# Patient Record
Sex: MALE | Race: ASIAN | Employment: FULL TIME | URBAN - METROPOLITAN AREA
[De-identification: names, ages, dates, MRNs, and addresses within clinical notes are randomized per-mention and may not be internally consistent; named-entity substitution may affect disease eponyms.]

---

## 2017-01-09 ENCOUNTER — TELEPHONE (OUTPATIENT)
Dept: FAMILY MEDICINE CLINIC | Facility: CLINIC | Age: 47
End: 2017-01-09

## 2017-01-09 DIAGNOSIS — K64.9 HEMORRHOIDS, UNSPECIFIED HEMORRHOID TYPE: Primary | ICD-10-CM

## 2017-01-09 NOTE — TELEPHONE ENCOUNTER
Not much I can do for hemorrhoids. I recommend he see a surgeon who can correct this issue.   Consult General Surgery, Dr. Pan Beavers   Tel: [phone]  Dxj [phone]

## 2017-01-09 NOTE — TELEPHONE ENCOUNTER
Problem with a hemorrhoid, squirts blood everytime he sits on the toilet. Right now pain is gone, purchased some medicated wipes, seem to help. Also increased fiber  In his diet. Had epiusodes of dizziness, he attributes to blood loss.  He increased fluid i

## 2017-01-09 NOTE — TELEPHONE ENCOUNTER
Dr. Clifton Carter no longer with THE [facility]. He needs referral for IHP, B/C B/S MIRYAMOShemar?

## 2017-01-09 NOTE — TELEPHONE ENCOUNTER
He will need to see Dr. Lianne Haas, but Dr. Lianne Haas is out of office currently. He could see Dr. Kecia Guzman, but that would be in [place].

## 2017-01-16 NOTE — TELEPHONE ENCOUNTER
Referral needs to be placed. Dr. Aaron Olivia will return to office as of tomorrow. Please advise. Referral pending. Thanks.

## 2017-02-01 ENCOUNTER — HOSPITAL ENCOUNTER (OUTPATIENT)
Dept: GENERAL RADIOLOGY | Age: 47
Discharge: HOME | End: 2017-02-01
Attending: FAMILY MEDICINE
Payer: COMMERCIAL

## 2017-02-01 ENCOUNTER — OFFICE VISIT (OUTPATIENT)
Dept: FAMILY MEDICINE CLINIC | Facility: CLINIC | Age: 47
End: 2017-02-01

## 2017-02-01 VITALS
SYSTOLIC BLOOD PRESSURE: 120 MMHG | WEIGHT: 226 LBS | RESPIRATION RATE: 16 BRPM | BODY MASS INDEX: 32 KG/M2 | TEMPERATURE: 98 F | HEART RATE: 84 BPM | DIASTOLIC BLOOD PRESSURE: 88 MMHG

## 2017-02-01 DIAGNOSIS — R07.89 CHEST HEAVINESS: ICD-10-CM

## 2017-02-01 DIAGNOSIS — R42 DIZZINESS: ICD-10-CM

## 2017-02-01 DIAGNOSIS — D64.9 ANEMIA, UNSPECIFIED TYPE: ICD-10-CM

## 2017-02-01 DIAGNOSIS — K64.8 OTHER HEMORRHOIDS: Primary | ICD-10-CM

## 2017-02-01 LAB
ALBUMIN SERPL-MCNC: 4.2 G/DL (ref 3.5–4.8)
ALP LIVER SERPL-CCNC: 71 U/L (ref 45–117)
ALT SERPL-CCNC: 35 U/L (ref 17–63)
AST SERPL-CCNC: 19 U/L (ref 15–41)
BILIRUB SERPL-MCNC: 0.4 MG/DL (ref 0.1–2)
BUN BLD-MCNC: 16 MG/DL (ref 8–20)
CALCIUM BLD-MCNC: 9.5 MG/DL (ref 8.3–10.3)
CHLORIDE: 106 MMOL/L (ref 101–111)
CO2: 26 MMOL/L (ref 22–32)
CREAT BLD-MCNC: 1.15 MG/DL (ref 0.7–1.3)
ERYTHROCYTE [DISTWIDTH] IN BLOOD BY AUTOMATED COUNT: 13.2 % (ref 11.5–16)
GLUCOSE BLD-MCNC: 77 MG/DL (ref 70–99)
HCT VFR BLD AUTO: 30.4 % (ref 37–53)
HGB BLD-MCNC: 9.9 G/DL (ref 13–17)
M PROTEIN MFR SERPL ELPH: 8.1 G/DL (ref 6.1–8.3)
MCH RBC QN AUTO: 28.9 PG (ref 27–33.2)
MCHC RBC AUTO-ENTMCNC: 32.6 G/DL (ref 31–37)
MCV RBC AUTO: 88.9 FL (ref 80–99)
PLATELET # BLD AUTO: 364 10(3)UL (ref 150–450)
POTASSIUM SERPL-SCNC: 4 MMOL/L (ref 3.6–5.1)
RBC # BLD AUTO: 3.42 X10(6)UL (ref 4.3–5.7)
RED CELL DISTRIBUTION WIDTH-SD: 42.9 FL (ref 35.1–46.3)
SODIUM SERPL-SCNC: 139 MMOL/L (ref 136–144)
TSI SER-ACNC: 1.24 MIU/ML (ref 0.35–5.5)
WBC # BLD AUTO: 8.3 X10(3) UL (ref 4–13)

## 2017-02-01 PROCEDURE — 71020 XR CHEST PA + LAT CHEST (CPT=71020): CPT

## 2017-02-01 PROCEDURE — 93000 ELECTROCARDIOGRAM COMPLETE: CPT | Performed by: FAMILY MEDICINE

## 2017-02-01 PROCEDURE — 85027 COMPLETE CBC AUTOMATED: CPT | Performed by: FAMILY MEDICINE

## 2017-02-01 PROCEDURE — 82607 VITAMIN B-12: CPT | Performed by: FAMILY MEDICINE

## 2017-02-01 PROCEDURE — 99214 OFFICE O/P EST MOD 30 MIN: CPT | Performed by: FAMILY MEDICINE

## 2017-02-01 PROCEDURE — 80053 COMPREHEN METABOLIC PANEL: CPT | Performed by: FAMILY MEDICINE

## 2017-02-01 PROCEDURE — 85652 RBC SED RATE AUTOMATED: CPT | Performed by: FAMILY MEDICINE

## 2017-02-01 PROCEDURE — 84443 ASSAY THYROID STIM HORMONE: CPT | Performed by: FAMILY MEDICINE

## 2017-02-01 PROCEDURE — 82728 ASSAY OF FERRITIN: CPT | Performed by: FAMILY MEDICINE

## 2017-02-01 NOTE — PROGRESS NOTES
Fátima Honeycutt is a 55year old male. CC:  Patient presents with:  Dizziness      HPI:  Feeling dizzy for 2-3 weeks. It occurs after going from being bent over to standing and can last 5-10 seconds.  He also notes intermittent chest heaviness that Yesy Mock M.D.   Supine:  /88, P 84  Sitting /85, P 84  Standing /70 P 84    Physical Exam:  GEN: well developed, well nourished, in no apparent distress  EYE: B conjunctiva and lids normal  HENT: normocephalic; normal nose, pharynx and TM'

## 2017-02-02 ENCOUNTER — TELEPHONE (OUTPATIENT)
Dept: FAMILY MEDICINE CLINIC | Facility: CLINIC | Age: 47
End: 2017-02-02

## 2017-02-02 DIAGNOSIS — D50.8 OTHER IRON DEFICIENCY ANEMIA: Primary | ICD-10-CM

## 2017-02-02 DIAGNOSIS — D64.9 ANEMIA, UNSPECIFIED TYPE: Primary | ICD-10-CM

## 2017-02-02 LAB
DEPRECATED HBV CORE AB SER IA-ACNC: 5.1 NG/ML (ref 22–322)
HAV AB SERPL IA-ACNC: 548 PG/ML (ref 193–986)
SED RATE-ML: 28 MM/HR (ref 0–12)

## 2017-02-02 NOTE — TELEPHONE ENCOUNTER
Left message on patients voice mail with the information per Dr Artemio Sauer left for [facility]

## 2017-02-02 NOTE — TELEPHONE ENCOUNTER
Please let patient know that the sugar, electrolyte, liver, kidney, thyroid, and leukemia tests were fine. He is anemic and this is likely the reason he has his current set of symptoms.  I'm not convinced that he is anemic because of his recent hemorrhoid i

## 2017-02-02 NOTE — TELEPHONE ENCOUNTER
Patient was advised that he is anemic and Dr. Leonard Tejada wants to make sure there is nothing going on with his GI tract

## 2017-03-13 ENCOUNTER — TELEPHONE (OUTPATIENT)
Dept: FAMILY MEDICINE CLINIC | Facility: CLINIC | Age: 47
End: 2017-03-13

## 2017-03-13 DIAGNOSIS — K64.8 OTHER HEMORRHOIDS: Primary | ICD-10-CM

## 2017-04-10 ENCOUNTER — TELEPHONE (OUTPATIENT)
Dept: FAMILY MEDICINE CLINIC | Facility: CLINIC | Age: 47
End: 2017-04-10

## 2017-05-19 ENCOUNTER — TELEPHONE (OUTPATIENT)
Dept: FAMILY MEDICINE CLINIC | Facility: CLINIC | Age: 47
End: 2017-05-19

## 2017-08-02 ENCOUNTER — OFFICE VISIT (OUTPATIENT)
Dept: FAMILY MEDICINE CLINIC | Facility: CLINIC | Age: 47
End: 2017-08-02

## 2017-08-02 VITALS
DIASTOLIC BLOOD PRESSURE: 80 MMHG | HEART RATE: 72 BPM | WEIGHT: 203 LBS | TEMPERATURE: 98 F | RESPIRATION RATE: 16 BRPM | HEIGHT: 68 IN | BODY MASS INDEX: 30.77 KG/M2 | SYSTOLIC BLOOD PRESSURE: 118 MMHG

## 2017-08-02 DIAGNOSIS — Z00.00 WELL ADULT EXAM: Primary | ICD-10-CM

## 2017-08-02 DIAGNOSIS — D64.9 ANEMIA, UNSPECIFIED TYPE: ICD-10-CM

## 2017-08-02 DIAGNOSIS — L91.8 CUTANEOUS SKIN TAGS: ICD-10-CM

## 2017-08-02 PROCEDURE — 99396 PREV VISIT EST AGE 40-64: CPT | Performed by: FAMILY MEDICINE

## 2017-08-02 NOTE — PROGRESS NOTES
Mayra Home is a 52year old male. CC:  Patient presents with:   Well Adult: per pt      HPI:  Yearly PX  Last lipid: 6/16    Immunization History  Administered            Date(s) Administered    Influenza Vaccine, No Preserv, 3YR + melena, he had hemorrhoidectomy earlier this year    (male): Denies nocturia, dysuria, hematuria   Heme: he is overdue to recheck labs for anemia found earlier this year, he took iron supplements for 2 months    Vitals: /80   Pulse 72   Temp 97.6 °

## 2017-08-05 ENCOUNTER — NURSE ONLY (OUTPATIENT)
Dept: FAMILY MEDICINE CLINIC | Facility: CLINIC | Age: 47
End: 2017-08-05

## 2017-08-05 ENCOUNTER — TELEPHONE (OUTPATIENT)
Dept: FAMILY MEDICINE CLINIC | Facility: CLINIC | Age: 47
End: 2017-08-05

## 2017-08-05 DIAGNOSIS — Z00.00 WELL ADULT EXAM: ICD-10-CM

## 2017-08-05 DIAGNOSIS — D50.0 IRON DEFICIENCY ANEMIA DUE TO CHRONIC BLOOD LOSS: ICD-10-CM

## 2017-08-05 LAB
BASOPHILS # BLD AUTO: 0.04 X10(3) UL (ref 0–0.1)
BASOPHILS NFR BLD AUTO: 0.7 %
CHOLEST SMN-MCNC: 222 MG/DL (ref ?–200)
EOSINOPHIL # BLD AUTO: 0.1 X10(3) UL (ref 0–0.3)
EOSINOPHIL NFR BLD AUTO: 1.9 %
ERYTHROCYTE [DISTWIDTH] IN BLOOD BY AUTOMATED COUNT: 13.8 % (ref 11.5–16)
HCT VFR BLD AUTO: 46.9 % (ref 37–53)
HDLC SERPL-MCNC: 62 MG/DL (ref 45–?)
HDLC SERPL: 3.58 {RATIO} (ref ?–4.97)
HGB BLD-MCNC: 15.3 G/DL (ref 13–17)
IMMATURE GRANULOCYTE COUNT: 0.01 X10(3) UL (ref 0–1)
IMMATURE GRANULOCYTE RATIO %: 0.2 %
IRON SATURATION: 14 % (ref 13–45)
IRON: 81 UG/DL (ref 45–182)
LDLC SERPL CALC-MCNC: 141 MG/DL (ref ?–130)
LDLC SERPL-MCNC: 19 MG/DL (ref 5–40)
LYMPHOCYTES # BLD AUTO: 2.27 X10(3) UL (ref 0.9–4)
LYMPHOCYTES NFR BLD AUTO: 42.1 %
MCH RBC QN AUTO: 29.7 PG (ref 27–33.2)
MCHC RBC AUTO-ENTMCNC: 32.6 G/DL (ref 31–37)
MCV RBC AUTO: 91.1 FL (ref 80–99)
MONOCYTES # BLD AUTO: 0.33 X10(3) UL (ref 0.1–0.6)
MONOCYTES NFR BLD AUTO: 6.1 %
NEUTROPHIL ABS PRELIM: 2.64 X10 (3) UL (ref 1.3–6.7)
NEUTROPHILS # BLD AUTO: 2.64 X10(3) UL (ref 1.3–6.7)
NEUTROPHILS NFR BLD AUTO: 49 %
NONHDLC SERPL-MCNC: 160 MG/DL (ref ?–130)
PLATELET # BLD AUTO: 255 10(3)UL (ref 150–450)
RBC # BLD AUTO: 5.15 X10(6)UL (ref 4.3–5.7)
RED CELL DISTRIBUTION WIDTH-SD: 46 FL (ref 35.1–46.3)
TOTAL IRON BINDING CAPACITY: 580 UG/DL (ref 298–536)
TRANSFERRIN: 389 MG/DL (ref 200–360)
TRIGLYCERIDES: 94 MG/DL (ref ?–150)
WBC # BLD AUTO: 5.4 X10(3) UL (ref 4–13)

## 2017-08-05 PROCEDURE — 80061 LIPID PANEL: CPT | Performed by: INTERNAL MEDICINE

## 2017-08-05 PROCEDURE — 83550 IRON BINDING TEST: CPT | Performed by: INTERNAL MEDICINE

## 2017-08-05 PROCEDURE — 83540 ASSAY OF IRON: CPT | Performed by: INTERNAL MEDICINE

## 2017-08-05 PROCEDURE — 85025 COMPLETE CBC W/AUTO DIFF WBC: CPT | Performed by: INTERNAL MEDICINE

## 2017-08-05 NOTE — TELEPHONE ENCOUNTER
Patient to clinic for lab draw. Lipid, TIBC and CBC. Patient asking if CMP will be drawn. Advised patient CMP wnl in February. Todays labs were to f/u on anemia check cholesterol. Patient wanted to make sure CMP not needed.   Specimen available if you

## 2017-08-08 ENCOUNTER — TELEPHONE (OUTPATIENT)
Dept: FAMILY MEDICINE CLINIC | Facility: CLINIC | Age: 47
End: 2017-08-08

## 2017-08-08 NOTE — TELEPHONE ENCOUNTER
Patient was advised of the blood work results and information regarding the CT calcium heart score. Patient is asking if he is still anemic?

## 2017-08-08 NOTE — TELEPHONE ENCOUNTER
----- Message from Nadine Chavez MD sent at 8/8/2017 12:36 PM CDT -----  Please let the patient know that the Total cholesterol was elevated, but that is compensated for by an HDL > 60.  I would like to have the patient get a CT calcium heart score to asse

## 2017-08-15 NOTE — PROGRESS NOTES
8/15/2017  Carmella Ramirez  [address] [facility] [address]    Dear Kt Martinez,       Here are your results from your recent visit with Gastroenterology. Normal CBC. Can stop iron treatment.             If you need any further assistance, ple

## 2017-08-19 ENCOUNTER — OFFICE VISIT (OUTPATIENT)
Dept: FAMILY MEDICINE CLINIC | Facility: CLINIC | Age: 47
End: 2017-08-19

## 2017-08-19 VITALS
DIASTOLIC BLOOD PRESSURE: 80 MMHG | SYSTOLIC BLOOD PRESSURE: 114 MMHG | RESPIRATION RATE: 16 BRPM | BODY MASS INDEX: 31 KG/M2 | WEIGHT: 207 LBS | TEMPERATURE: 97 F | HEART RATE: 72 BPM

## 2017-08-19 DIAGNOSIS — T63.481A ALLERGIC REACTION TO INSECT STING, ACCIDENTAL OR UNINTENTIONAL, INITIAL ENCOUNTER: Primary | ICD-10-CM

## 2017-08-19 PROCEDURE — 99213 OFFICE O/P EST LOW 20 MIN: CPT | Performed by: FAMILY MEDICINE

## 2017-08-19 RX ORDER — PREDNISONE 20 MG/1
TABLET ORAL
Qty: 20 TABLET | Refills: 0 | Status: SHIPPED | OUTPATIENT
Start: 2017-08-19 | End: 2017-08-29 | Stop reason: ALTCHOICE

## 2017-08-19 NOTE — PROGRESS NOTES
Lilian Montes is a 52year old male. HPI:   Pt is here for eval of itching for a week. Started after he had sat on a beach in [place] for several hours, noticed itching all over. The next day started noticing red bumps.  He thought maybe it was bed bu exertion  CARDIOVASCULAR: denies chest pain on exertion  GI: denies abdominal pain/n/v  NEURO: denies headaches    EXAM:   /80   Pulse 72   Temp (!) 97.3 °F (36.3 °C) (Temporal)   Resp 16   Wt 207 lb   BMI 31.47 kg/m²   GENERAL: well developed, well

## 2017-08-29 ENCOUNTER — TELEPHONE (OUTPATIENT)
Dept: FAMILY MEDICINE CLINIC | Facility: CLINIC | Age: 47
End: 2017-08-29

## 2017-08-29 ENCOUNTER — PATIENT MESSAGE (OUTPATIENT)
Dept: FAMILY MEDICINE CLINIC | Facility: CLINIC | Age: 47
End: 2017-08-29

## 2017-08-29 RX ORDER — PREDNISONE 20 MG/1
TABLET ORAL
Qty: 20 TABLET | Refills: 0 | Status: SHIPPED | OUTPATIENT
Start: 2017-08-29 | End: 2018-08-01

## 2017-08-29 NOTE — TELEPHONE ENCOUNTER
Patient notified and verbalized understanding. States he does not like taking steroids. He will think about it and call office back tomorrow if he decides to take another course of prednisone.

## 2017-08-29 NOTE — TELEPHONE ENCOUNTER
From: Martina Garcia  To: Beny Downey MD  Sent: 8/29/2017 3:46 PM CDT  Subject: Visit Follow-up Question    Good afternoon Dr. Halima Andrade. You saw me on Aug 19 about my insect bites.  I religiously followed the instructions with the Prednisone, I have been

## 2017-08-29 NOTE — TELEPHONE ENCOUNTER
Patient states he saw Dr Shelli Will for insect bites and was given prednisone taper and advised to take Zyrtec BID and apply cortisone cream.    Patient states insect bites and itching subsided on the higher doses of prednisone but when tapering to 1/2 pill margo

## 2017-08-29 NOTE — TELEPHONE ENCOUNTER
Patient has decided that he will try taking the steroids. Please send RX to [facility].

## 2017-08-29 NOTE — TELEPHONE ENCOUNTER
Our options are to let the skin heal itself over the next 4 weeks, or we could try the same Prednisone taper again.

## 2017-10-06 ENCOUNTER — IMMUNIZATION (OUTPATIENT)
Dept: FAMILY MEDICINE CLINIC | Facility: CLINIC | Age: 47
End: 2017-10-06

## 2017-10-06 ENCOUNTER — MED REC SCAN ONLY (OUTPATIENT)
Dept: FAMILY MEDICINE CLINIC | Facility: CLINIC | Age: 47
End: 2017-10-06

## 2017-10-06 DIAGNOSIS — Z23 NEED FOR VACCINATION: ICD-10-CM

## 2017-10-06 PROCEDURE — 90686 IIV4 VACC NO PRSV 0.5 ML IM: CPT | Performed by: FAMILY MEDICINE

## 2017-10-06 PROCEDURE — 90471 IMMUNIZATION ADMIN: CPT | Performed by: FAMILY MEDICINE

## 2017-10-12 ENCOUNTER — TELEPHONE (OUTPATIENT)
Dept: FAMILY MEDICINE CLINIC | Facility: CLINIC | Age: 47
End: 2017-10-12

## 2017-10-12 NOTE — TELEPHONE ENCOUNTER
Left message on patients voice mail that the referral is still pending. Patient will be contacted once the determination has been made.

## 2017-10-13 ENCOUNTER — TELEPHONE (OUTPATIENT)
Dept: FAMILY MEDICINE CLINIC | Facility: CLINIC | Age: 47
End: 2017-10-13

## 2017-12-11 ENCOUNTER — CHARTING TRANS (OUTPATIENT)
Dept: OCCUPATIONAL MEDICINE | Age: 47
End: 2017-12-11

## 2018-04-20 ENCOUNTER — OFFICE VISIT (OUTPATIENT)
Dept: FAMILY MEDICINE CLINIC | Facility: CLINIC | Age: 48
End: 2018-04-20

## 2018-04-20 VITALS
HEART RATE: 98 BPM | TEMPERATURE: 68 F | SYSTOLIC BLOOD PRESSURE: 118 MMHG | WEIGHT: 220 LBS | RESPIRATION RATE: 16 BRPM | HEIGHT: 68 IN | OXYGEN SATURATION: 98 % | BODY MASS INDEX: 33.34 KG/M2 | DIASTOLIC BLOOD PRESSURE: 84 MMHG

## 2018-04-20 DIAGNOSIS — R05.9 COUGH: Primary | ICD-10-CM

## 2018-04-20 PROCEDURE — 99213 OFFICE O/P EST LOW 20 MIN: CPT | Performed by: FAMILY MEDICINE

## 2018-04-20 NOTE — PROGRESS NOTES
Salma Scales is a 52year old male. CC:  Patient presents with:  Cough: per pt      HPI:  Worried about recent cough. He had been to the [address] [place] [address] Road last month and came back with a cough. It is basically gone now. He has no fever, SOB or chills. developed, well nourished, in no apparent distress  EYE: B conjunctiva and lids normal  HENT: normocephalic; normal nose, pharynx and TM's  NECK: No lymphadenopathy, thyromegaly or masses  CAR: S1, S2 normal, RRR; no S3, no S4; no click; murmur negative  P

## 2018-07-03 ENCOUNTER — PATIENT MESSAGE (OUTPATIENT)
Dept: FAMILY MEDICINE CLINIC | Facility: CLINIC | Age: 48
End: 2018-07-03

## 2018-07-03 RX ORDER — INDOMETHACIN 75 MG/1
75 CAPSULE, EXTENDED RELEASE ORAL 2 TIMES DAILY WITH MEALS
Qty: 60 CAPSULE | Refills: 3 | Status: SHIPPED | OUTPATIENT
Start: 2018-07-03 | End: 2021-07-26

## 2018-07-03 NOTE — TELEPHONE ENCOUNTER
From: Carmella Ramirez  To: Diego Fuentes MD  Sent: 7/3/2018 7:00 AM CDT  Subject: Prescription Question    Good morning. May you please refill my indomethacin? Thank you.

## 2018-07-03 NOTE — TELEPHONE ENCOUNTER
Last refill: 10/12/2016 #60 with 0 refills  Last Visit: 4/20/2018  Next Visit: Future Appointments  Date Time Provider Erasmo Romero   8/1/2018 5:30 PM Vic Olmedo MD [facility]         Forward to Dr. Alexy Avendano (covering provider) please advise

## 2018-08-01 ENCOUNTER — OFFICE VISIT (OUTPATIENT)
Dept: FAMILY MEDICINE CLINIC | Facility: CLINIC | Age: 48
End: 2018-08-01

## 2018-08-01 VITALS
HEART RATE: 68 BPM | SYSTOLIC BLOOD PRESSURE: 122 MMHG | HEIGHT: 68 IN | RESPIRATION RATE: 14 BRPM | TEMPERATURE: 97 F | DIASTOLIC BLOOD PRESSURE: 84 MMHG | WEIGHT: 220.81 LBS | BODY MASS INDEX: 33.47 KG/M2

## 2018-08-01 DIAGNOSIS — Z00.00 WELL ADULT EXAM: Primary | ICD-10-CM

## 2018-08-01 DIAGNOSIS — R07.9 CHEST PAIN, UNSPECIFIED TYPE: ICD-10-CM

## 2018-08-01 DIAGNOSIS — L91.8 CUTANEOUS SKIN TAGS: ICD-10-CM

## 2018-08-01 DIAGNOSIS — Z01.118 ABNORMAL EAR EXAM: ICD-10-CM

## 2018-08-01 DIAGNOSIS — Z87.891 FORMER SMOKER: ICD-10-CM

## 2018-08-01 PROCEDURE — 99396 PREV VISIT EST AGE 40-64: CPT | Performed by: FAMILY MEDICINE

## 2018-08-01 NOTE — PROGRESS NOTES
Carmella Ramirez is a 50year old male.     CC:  Patient presents with:  Physical: per pt      HPI:  Yearly PX   Last lipid: 8/17   Last PSA: na     Immunization History   Administered            Date(s) Administered     Flulaval 0.5 ml 6 months & older Packs/day: 0.50      Years: 20.00        Types: Cigarettes     Quit date: 1/1/2000  Smokeless tobacco: Never Used                      Alcohol use:  No                 ROS:  General: energy level stable  ENT: Denies deafness, e T4; Future    2. Abnormal ear exam  Consult ENT to ensure cholesteatoma not present  - ENT - INTERNAL    3. Cutaneous skin tags  F/u in the Fall for removal    4. Chest pain, unspecified type  Likely a costochondritis.  He will try an Indocin when pain pres

## 2018-08-02 ENCOUNTER — HOSPITAL ENCOUNTER (OUTPATIENT)
Dept: GENERAL RADIOLOGY | Age: 48
Discharge: HOME | End: 2018-08-02
Attending: FAMILY MEDICINE
Payer: COMMERCIAL

## 2018-08-02 DIAGNOSIS — Z87.891 FORMER SMOKER: ICD-10-CM

## 2018-08-02 DIAGNOSIS — R07.9 CHEST PAIN, UNSPECIFIED TYPE: ICD-10-CM

## 2018-08-02 PROCEDURE — 71046 X-RAY EXAM CHEST 2 VIEWS: CPT | Performed by: FAMILY MEDICINE

## 2018-08-06 ENCOUNTER — NURSE ONLY (OUTPATIENT)
Dept: FAMILY MEDICINE CLINIC | Facility: CLINIC | Age: 48
End: 2018-08-06

## 2018-08-06 DIAGNOSIS — M79.673 PAIN OF FOOT, UNSPECIFIED LATERALITY: ICD-10-CM

## 2018-08-06 DIAGNOSIS — Z00.00 WELL ADULT EXAM: ICD-10-CM

## 2018-08-06 LAB
ALBUMIN SERPL-MCNC: 3.8 G/DL (ref 3.5–4.8)
ALBUMIN/GLOB SERPL: 1 {RATIO} (ref 1–2)
ALP LIVER SERPL-CCNC: 76 U/L (ref 45–117)
ALT SERPL-CCNC: 39 U/L (ref 17–63)
ANION GAP SERPL CALC-SCNC: 10 MMOL/L (ref 0–18)
AST SERPL-CCNC: 22 U/L (ref 15–41)
BILIRUB SERPL-MCNC: 1 MG/DL (ref 0.1–2)
BUN BLD-MCNC: 14 MG/DL (ref 8–20)
BUN/CREAT SERPL: 12 (ref 10–20)
CALCIUM BLD-MCNC: 9.5 MG/DL (ref 8.3–10.3)
CHLORIDE SERPL-SCNC: 107 MMOL/L (ref 101–111)
CHOLEST SMN-MCNC: 237 MG/DL (ref ?–200)
CO2 SERPL-SCNC: 23 MMOL/L (ref 22–32)
CREAT BLD-MCNC: 1.17 MG/DL (ref 0.7–1.3)
ERYTHROCYTE [DISTWIDTH] IN BLOOD BY AUTOMATED COUNT: 11.1 % (ref 11.5–16)
GLOBULIN PLAS-MCNC: 4 G/DL (ref 2.5–3.7)
GLUCOSE BLD-MCNC: 100 MG/DL (ref 70–99)
HCT VFR BLD AUTO: 49.5 % (ref 37–53)
HDLC SERPL-MCNC: 47 MG/DL (ref 40–59)
HGB BLD-MCNC: 16.7 G/DL (ref 13–17)
LDLC SERPL CALC-MCNC: 141 MG/DL (ref ?–100)
M PROTEIN MFR SERPL ELPH: 7.8 G/DL (ref 6.1–8.3)
MCH RBC QN AUTO: 32.1 PG (ref 27–33.2)
MCHC RBC AUTO-ENTMCNC: 33.7 G/DL (ref 31–37)
MCV RBC AUTO: 95.2 FL (ref 80–99)
NONHDLC SERPL-MCNC: 190 MG/DL (ref ?–130)
OSMOLALITY SERPL CALC.SUM OF ELEC: 291 MOSM/KG (ref 275–295)
PLATELET # BLD AUTO: 249 10(3)UL (ref 150–450)
POTASSIUM SERPL-SCNC: 4 MMOL/L (ref 3.6–5.1)
RBC # BLD AUTO: 5.2 X10(6)UL (ref 4.3–5.7)
RED CELL DISTRIBUTION WIDTH-SD: 39.1 FL (ref 35.1–46.3)
SODIUM SERPL-SCNC: 140 MMOL/L (ref 136–144)
TRIGL SERPL-MCNC: 243 MG/DL (ref 30–149)
TSI SER-ACNC: 1 MIU/ML (ref 0.35–5.5)
VLDLC SERPL CALC-MCNC: 49 MG/DL (ref 0–30)
WBC # BLD AUTO: 7 X10(3) UL (ref 4–13)

## 2018-08-06 PROCEDURE — 36415 COLL VENOUS BLD VENIPUNCTURE: CPT | Performed by: FAMILY MEDICINE

## 2018-08-06 PROCEDURE — 80061 LIPID PANEL: CPT | Performed by: FAMILY MEDICINE

## 2018-08-06 PROCEDURE — 85027 COMPLETE CBC AUTOMATED: CPT | Performed by: FAMILY MEDICINE

## 2018-08-06 PROCEDURE — 80053 COMPREHEN METABOLIC PANEL: CPT | Performed by: FAMILY MEDICINE

## 2018-08-06 PROCEDURE — 84443 ASSAY THYROID STIM HORMONE: CPT | Performed by: FAMILY MEDICINE

## 2018-08-06 PROCEDURE — 84550 ASSAY OF BLOOD/URIC ACID: CPT | Performed by: FAMILY MEDICINE

## 2018-08-06 NOTE — PROGRESS NOTES
Vitaliy Benites presents today for nurse visit. 1 light green, 1 lavedner drawn from right ac area with 1 attempt with beti jimenez. Left office in stable condition.

## 2018-08-07 ENCOUNTER — TELEPHONE (OUTPATIENT)
Dept: FAMILY MEDICINE CLINIC | Facility: CLINIC | Age: 48
End: 2018-08-07

## 2018-08-07 DIAGNOSIS — M79.673 PAIN OF FOOT, UNSPECIFIED LATERALITY: Primary | ICD-10-CM

## 2018-08-07 LAB — URATE SERPL-MCNC: 8.1 MG/DL (ref 2.4–8.7)

## 2018-08-07 RX ORDER — ATORVASTATIN CALCIUM 10 MG/1
10 TABLET, FILM COATED ORAL NIGHTLY
Qty: 30 TABLET | Refills: 3 | Status: SHIPPED | OUTPATIENT
Start: 2018-08-07 | End: 2019-08-02

## 2018-08-07 RX ORDER — INDOMETHACIN 75 MG/1
CAPSULE, EXTENDED RELEASE ORAL
Refills: 2 | COMMUNITY
Start: 2018-07-17 | End: 2020-10-09

## 2018-08-23 NOTE — TELEPHONE ENCOUNTER
Notes recorded by Abi Gaytan MD on 8/7/2018 at 1:33 PM CDT  Please let patient know that the sugar, electrolyte, liver, kidney, and thyroid, tests were fine. There is no anemia and the white blood cell count is fine.   Lipids are elevated and worse the
Patient notified and verbalized understanding. Patient agreeable to starting new med. Script sent to pharmacy. Patient aware he needs to f/u in 3 months. Advised to call office if any increase in muscle pain.     Patient also asking if uric acid can be
See uric acid result note
negative...

## 2018-09-07 ENCOUNTER — MED REC SCAN ONLY (OUTPATIENT)
Dept: FAMILY MEDICINE CLINIC | Facility: CLINIC | Age: 48
End: 2018-09-07

## 2018-10-08 RX ORDER — ATORVASTATIN CALCIUM 10 MG/1
TABLET, FILM COATED ORAL
Qty: 90 TABLET | Refills: 3 | OUTPATIENT
Start: 2018-10-08

## 2018-10-09 ENCOUNTER — IMMUNIZATION (OUTPATIENT)
Dept: FAMILY MEDICINE CLINIC | Facility: CLINIC | Age: 48
End: 2018-10-09

## 2018-10-09 DIAGNOSIS — Z23 NEED FOR VACCINATION: ICD-10-CM

## 2018-10-09 PROCEDURE — 90471 IMMUNIZATION ADMIN: CPT | Performed by: FAMILY MEDICINE

## 2018-10-09 PROCEDURE — 90686 IIV4 VACC NO PRSV 0.5 ML IM: CPT | Performed by: FAMILY MEDICINE

## 2018-11-14 ENCOUNTER — MED REC SCAN ONLY (OUTPATIENT)
Dept: FAMILY MEDICINE CLINIC | Facility: CLINIC | Age: 48
End: 2018-11-14

## 2018-11-27 VITALS
SYSTOLIC BLOOD PRESSURE: 130 MMHG | DIASTOLIC BLOOD PRESSURE: 86 MMHG | TEMPERATURE: 98.7 F | HEART RATE: 82 BPM | RESPIRATION RATE: 16 BRPM

## 2018-12-19 ENCOUNTER — WORKER'S COMP (OUTPATIENT)
Dept: OCCUPATIONAL MEDICINE | Age: 48
End: 2018-12-19

## 2018-12-19 DIAGNOSIS — S61.215A LACERATION OF LEFT RING FINGER WITHOUT FOREIGN BODY WITHOUT DAMAGE TO NAIL, INITIAL ENCOUNTER: Primary | ICD-10-CM

## 2018-12-19 DIAGNOSIS — Z02.71 ISSUE OF MEDICAL CERTIFICATE FOR DISABILITY EXAMINATION: ICD-10-CM

## 2018-12-19 PROCEDURE — 99202 OFFICE O/P NEW SF 15 MIN: CPT | Performed by: PHYSICIAN ASSISTANT

## 2018-12-19 PROCEDURE — A6453 SELF-ADHER BAND W <3"/YD: HCPCS | Performed by: PHYSICIAN ASSISTANT

## 2018-12-19 PROCEDURE — A6206 CONTACT LAYER <= 16 SQ IN: HCPCS | Performed by: PHYSICIAN ASSISTANT

## 2018-12-28 ENCOUNTER — WORKER'S COMP (OUTPATIENT)
Dept: OCCUPATIONAL MEDICINE | Age: 48
End: 2018-12-28

## 2018-12-28 DIAGNOSIS — S61.215D LACERATION OF LEFT RING FINGER WITHOUT FOREIGN BODY WITHOUT DAMAGE TO NAIL, SUBSEQUENT ENCOUNTER: Primary | ICD-10-CM

## 2018-12-28 DIAGNOSIS — Z02.71 ISSUE OF MEDICAL CERTIFICATE FOR DISABILITY EXAMINATION: ICD-10-CM

## 2018-12-28 PROCEDURE — 99212 OFFICE O/P EST SF 10 MIN: CPT | Performed by: PHYSICIAN ASSISTANT

## 2019-04-13 ENCOUNTER — OFFICE VISIT (OUTPATIENT)
Dept: FAMILY MEDICINE CLINIC | Facility: CLINIC | Age: 49
End: 2019-04-13

## 2019-04-13 VITALS
OXYGEN SATURATION: 96 % | TEMPERATURE: 98 F | DIASTOLIC BLOOD PRESSURE: 70 MMHG | WEIGHT: 226 LBS | RESPIRATION RATE: 16 BRPM | BODY MASS INDEX: 34 KG/M2 | SYSTOLIC BLOOD PRESSURE: 110 MMHG | HEART RATE: 79 BPM

## 2019-04-13 DIAGNOSIS — L91.8 CUTANEOUS SKIN TAGS: Primary | ICD-10-CM

## 2019-04-13 PROCEDURE — 99213 OFFICE O/P EST LOW 20 MIN: CPT | Performed by: FAMILY MEDICINE

## 2019-04-13 NOTE — PROGRESS NOTES
Nayan Hernandez is a 50year old male. CC:  Patient presents with:  Derm Problem: skin tag removal--- per pt      HPI:  Here for skin tags on the neck and armpits. Present for many years. No tx tried.   Allergies:  No Known Allergies   Current Meds: posterior neck and each armpit with larger tags      Procedure: B axillary and R posterior neck larger tags cleansed with etoh wipes x 2. Local nesthesia obtained with 1% Xylocaine with epi x 1 ml. Each lesion removed with pick ups and scissor excision.   R

## 2019-07-10 ENCOUNTER — OFFICE VISIT (OUTPATIENT)
Dept: FAMILY MEDICINE CLINIC | Facility: CLINIC | Age: 49
End: 2019-07-10

## 2019-07-10 VITALS
DIASTOLIC BLOOD PRESSURE: 70 MMHG | OXYGEN SATURATION: 98 % | HEIGHT: 67 IN | BODY MASS INDEX: 34.69 KG/M2 | RESPIRATION RATE: 14 BRPM | SYSTOLIC BLOOD PRESSURE: 120 MMHG | WEIGHT: 221 LBS | TEMPERATURE: 98 F | HEART RATE: 96 BPM

## 2019-07-10 DIAGNOSIS — R20.2 PARESTHESIA OF FINGER: ICD-10-CM

## 2019-07-10 DIAGNOSIS — R22.32 MASS OF LEFT HAND: Primary | ICD-10-CM

## 2019-07-10 PROCEDURE — 99213 OFFICE O/P EST LOW 20 MIN: CPT | Performed by: FAMILY MEDICINE
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
## 2019-10-23 ENCOUNTER — OFFICE VISIT (OUTPATIENT)
Dept: FAMILY MEDICINE CLINIC | Facility: CLINIC | Age: 49
End: 2019-10-23

## 2019-10-23 VITALS
RESPIRATION RATE: 16 BRPM | WEIGHT: 233 LBS | OXYGEN SATURATION: 98 % | DIASTOLIC BLOOD PRESSURE: 86 MMHG | TEMPERATURE: 98 F | SYSTOLIC BLOOD PRESSURE: 122 MMHG | HEART RATE: 96 BPM | BODY MASS INDEX: 36 KG/M2

## 2019-10-23 DIAGNOSIS — Z00.00 WELL ADULT EXAM: Primary | ICD-10-CM

## 2019-10-23 DIAGNOSIS — Z23 NEED FOR VACCINATION: ICD-10-CM

## 2019-10-23 PROCEDURE — 90471 IMMUNIZATION ADMIN: CPT | Performed by: FAMILY MEDICINE

## 2019-10-23 PROCEDURE — 99396 PREV VISIT EST AGE 40-64: CPT | Performed by: FAMILY MEDICINE

## 2019-10-23 PROCEDURE — 90686 IIV4 VACC NO PRSV 0.5 ML IM: CPT | Performed by: FAMILY MEDICINE

## 2019-10-23 NOTE — PROGRESS NOTES
Penni Apgar is a 52year old male.     CC:  Patient presents with:  Physical: per pt- flu shot      HPI:  Yearly PX    Last lipid:  Lab Results   Component Value Date    CHOLEST 237 (H) 2018    TRIG 243 (H) 2018    HDL 47 2018 ROS:  General: energy level stable  Cardiovascular/Pulses: Denies palpitations, tachycardia, irregular heart beat, chest pain, exertional chest pain or pressure, paroxysmal nocturnal dyspnea  Respiratory: Denies cough, dyspnea, dyspnea on exertion  GI:

## 2019-10-25 ENCOUNTER — NURSE ONLY (OUTPATIENT)
Dept: FAMILY MEDICINE CLINIC | Facility: CLINIC | Age: 49
End: 2019-10-25

## 2019-10-25 DIAGNOSIS — Z00.00 WELL ADULT EXAM: ICD-10-CM

## 2019-10-25 PROCEDURE — 85027 COMPLETE CBC AUTOMATED: CPT | Performed by: FAMILY MEDICINE

## 2019-10-25 PROCEDURE — 80053 COMPREHEN METABOLIC PANEL: CPT | Performed by: FAMILY MEDICINE

## 2019-10-25 PROCEDURE — 80061 LIPID PANEL: CPT | Performed by: FAMILY MEDICINE

## 2019-10-25 PROCEDURE — 84443 ASSAY THYROID STIM HORMONE: CPT | Performed by: FAMILY MEDICINE

## 2019-10-25 NOTE — PATIENT INSTRUCTIONS
Blood work drawn per orders in chart for Dr. Soham Pantoja  1 mint  1 purple  22g right antecubital   CMP, Lipid, TSH with reflex, Hemog

## 2019-10-26 DIAGNOSIS — R79.89 ELEVATED LFTS: Primary | ICD-10-CM

## 2019-10-26 DIAGNOSIS — E78.5 HYPERLIPIDEMIA, UNSPECIFIED HYPERLIPIDEMIA TYPE: ICD-10-CM

## 2019-12-04 ENCOUNTER — TELEPHONE (OUTPATIENT)
Dept: FAMILY MEDICINE CLINIC | Facility: CLINIC | Age: 49
End: 2019-12-04

## 2020-10-09 ENCOUNTER — OFFICE VISIT (OUTPATIENT)
Dept: FAMILY MEDICINE CLINIC | Facility: CLINIC | Age: 50
End: 2020-10-09
Payer: COMMERCIAL

## 2020-10-09 VITALS
SYSTOLIC BLOOD PRESSURE: 122 MMHG | OXYGEN SATURATION: 98 % | TEMPERATURE: 98 F | WEIGHT: 230.63 LBS | HEART RATE: 95 BPM | HEIGHT: 68.5 IN | BODY MASS INDEX: 34.55 KG/M2 | DIASTOLIC BLOOD PRESSURE: 84 MMHG | RESPIRATION RATE: 17 BRPM

## 2020-10-09 DIAGNOSIS — E78.5 HYPERLIPIDEMIA, UNSPECIFIED HYPERLIPIDEMIA TYPE: ICD-10-CM

## 2020-10-09 DIAGNOSIS — Z00.00 WELL ADULT EXAM: Primary | ICD-10-CM

## 2020-10-09 PROCEDURE — 84460 ALANINE AMINO (ALT) (SGPT): CPT | Performed by: FAMILY MEDICINE

## 2020-10-09 PROCEDURE — 85027 COMPLETE CBC AUTOMATED: CPT | Performed by: FAMILY MEDICINE

## 2020-10-09 PROCEDURE — 3079F DIAST BP 80-89 MM HG: CPT | Performed by: FAMILY MEDICINE

## 2020-10-09 PROCEDURE — 99396 PREV VISIT EST AGE 40-64: CPT | Performed by: FAMILY MEDICINE

## 2020-10-09 PROCEDURE — 84439 ASSAY OF FREE THYROXINE: CPT | Performed by: FAMILY MEDICINE

## 2020-10-09 PROCEDURE — 90471 IMMUNIZATION ADMIN: CPT | Performed by: FAMILY MEDICINE

## 2020-10-09 PROCEDURE — 80061 LIPID PANEL: CPT | Performed by: FAMILY MEDICINE

## 2020-10-09 PROCEDURE — 3074F SYST BP LT 130 MM HG: CPT | Performed by: FAMILY MEDICINE

## 2020-10-09 PROCEDURE — 84153 ASSAY OF PSA TOTAL: CPT | Performed by: FAMILY MEDICINE

## 2020-10-09 PROCEDURE — 3008F BODY MASS INDEX DOCD: CPT | Performed by: FAMILY MEDICINE

## 2020-10-09 PROCEDURE — 90686 IIV4 VACC NO PRSV 0.5 ML IM: CPT | Performed by: FAMILY MEDICINE

## 2020-10-09 PROCEDURE — 36415 COLL VENOUS BLD VENIPUNCTURE: CPT | Performed by: FAMILY MEDICINE

## 2020-10-09 PROCEDURE — 84443 ASSAY THYROID STIM HORMONE: CPT | Performed by: FAMILY MEDICINE

## 2020-10-09 PROCEDURE — 84450 TRANSFERASE (AST) (SGOT): CPT | Performed by: FAMILY MEDICINE

## 2020-10-09 PROCEDURE — 80048 BASIC METABOLIC PNL TOTAL CA: CPT | Performed by: FAMILY MEDICINE

## 2020-10-09 NOTE — PROGRESS NOTES
Suzy Mathew is a 48year old male.     CC:  Patient presents with:  Physical: per pt      HPI:  Yearly PX    Last lipid:  Lab Results   Component Value Date    CHOLEST 237 (H) 10/25/2019    TRIG 227 (H) 10/25/2019    HDL 53 10/25/2019     (H 20.7      Smokeless tobacco: Never Used    Alcohol use: No    Drug use: No       ROS:  General: energy level stable, appetite and wt stable   Cardiovascular/Pulses: Denies palpitations, tachycardia, irregular heart beat, chest pain, exertional chest pain o VENIPUNCTURE  - ALT (SGPT)  - AST (SGOT)  - BASIC METABOLIC PANEL (8)  - CBC, PLATELET; NO DIFFERENTIAL  - LIPID PANEL  - PSA SCREEN  - TSH+FREE T4    2. Hyperlipidemia, unspecified hyperlipidemia type  Await results   - LIPID PANEL;  Future        Orders f

## 2020-10-10 DIAGNOSIS — E78.5 HYPERLIPIDEMIA, UNSPECIFIED HYPERLIPIDEMIA TYPE: Primary | ICD-10-CM

## 2020-10-26 ENCOUNTER — TELEMEDICINE (OUTPATIENT)
Dept: FAMILY MEDICINE CLINIC | Facility: CLINIC | Age: 50
End: 2020-10-26
Payer: COMMERCIAL

## 2020-10-26 ENCOUNTER — TELEPHONE (OUTPATIENT)
Dept: FAMILY MEDICINE CLINIC | Facility: CLINIC | Age: 50
End: 2020-10-26

## 2020-10-26 DIAGNOSIS — M79.10 MYALGIA: ICD-10-CM

## 2020-10-26 DIAGNOSIS — R50.9 FEVER, UNSPECIFIED FEVER CAUSE: Primary | ICD-10-CM

## 2020-10-26 PROCEDURE — 99213 OFFICE O/P EST LOW 20 MIN: CPT | Performed by: FAMILY MEDICINE

## 2020-10-26 NOTE — TELEPHONE ENCOUNTER
Fátima Honeycutt verbally {consents to a [employer] on 10/26/2020.   Patient understands and accepts financial responsibility for any deductible, co-insurance and/or co-pays associated with this service

## 2020-10-26 NOTE — PROGRESS NOTES
My Chart/ Video/Telephone Visit Check-In Due to Regis verbally consents a video Check-In service on 10/26/20.   Patient understands and accepts financial responsibility for any deductible, co-insurance and/or co-pays associa pressured  Resp: No respiratory distress noted when conversing with me, able to speak in full sentences. ASSESSMENT AND PLAN    1.  Fever, unspecified fever cause  Rest and push fluids  Tylenol as needed for pain and/or fevers  Await results--the patien

## 2020-10-27 ENCOUNTER — APPOINTMENT (OUTPATIENT)
Dept: LAB | Age: 50
End: 2020-10-27
Attending: FAMILY MEDICINE
Payer: COMMERCIAL

## 2020-10-27 DIAGNOSIS — M79.10 MYALGIA: ICD-10-CM

## 2020-10-27 DIAGNOSIS — R50.9 FEVER, UNSPECIFIED FEVER CAUSE: ICD-10-CM

## 2020-10-29 ENCOUNTER — APPOINTMENT (OUTPATIENT)
Dept: GENERAL RADIOLOGY | Facility: HOSPITAL | Age: 50
DRG: 177 | End: 2020-10-29
Attending: EMERGENCY MEDICINE
Payer: COMMERCIAL

## 2020-10-29 ENCOUNTER — HOSPITAL ENCOUNTER (INPATIENT)
Facility: HOSPITAL | Age: 50
LOS: 5 days | Discharge: HOME | DRG: 177 | End: 2020-11-04
Attending: EMERGENCY MEDICINE | Admitting: HOSPITALIST
Payer: COMMERCIAL

## 2020-10-29 ENCOUNTER — TELEPHONE (OUTPATIENT)
Dept: FAMILY MEDICINE CLINIC | Facility: CLINIC | Age: 50
End: 2020-10-29

## 2020-10-29 DIAGNOSIS — R09.02 HYPOXIA: ICD-10-CM

## 2020-10-29 DIAGNOSIS — U07.1 PNEUMONIA DUE TO COVID-19 VIRUS: Primary | ICD-10-CM

## 2020-10-29 DIAGNOSIS — J12.82 PNEUMONIA DUE TO COVID-19 VIRUS: Primary | ICD-10-CM

## 2020-10-29 PROCEDURE — 71045 X-RAY EXAM CHEST 1 VIEW: CPT | Performed by: EMERGENCY MEDICINE

## 2020-10-29 PROCEDURE — 3E0333Z INTRODUCTION OF ANTI-INFLAMMATORY INTO PERIPHERAL VEIN, PERCUTANEOUS APPROACH: ICD-10-PCS | Performed by: HOSPITALIST

## 2020-10-29 PROCEDURE — 99223 1ST HOSP IP/OBS HIGH 75: CPT | Performed by: HOSPITALIST

## 2020-10-29 RX ORDER — ACETAMINOPHEN 500 MG
1000 TABLET ORAL ONCE
Status: COMPLETED | OUTPATIENT
Start: 2020-10-29 | End: 2020-10-29

## 2020-10-29 RX ORDER — DEXAMETHASONE SODIUM PHOSPHATE 4 MG/ML
6 VIAL (ML) INJECTION ONCE
Status: COMPLETED | OUTPATIENT
Start: 2020-10-29 | End: 2020-10-29

## 2020-10-29 NOTE — TELEPHONE ENCOUNTER
Pt has been having SOB, fever, stomach ache, coughing, vomiting and body aches. He is feeling very very weak. He has taken tylenol and its not helping. Pt would like advice as to what to do. Pt is gagging while on phone. PT advised to go to ER asap.  Pt

## 2020-10-29 NOTE — TELEPHONE ENCOUNTER
Patient advised of the information per Dallas Etienne. Asked patient if needs the office to a ambulance.  Patient states that his wife is going to take him to the hospital

## 2020-10-30 PROBLEM — R09.02 HYPOXIA: Status: ACTIVE | Noted: 2020-10-30

## 2020-10-30 PROCEDURE — XW033E5 INTRODUCTION OF REMDESIVIR ANTI-INFECTIVE INTO PERIPHERAL VEIN, PERCUTANEOUS APPROACH, NEW TECHNOLOGY GROUP 5: ICD-10-PCS | Performed by: HOSPITALIST

## 2020-10-30 PROCEDURE — 99232 SBSQ HOSP IP/OBS MODERATE 35: CPT | Performed by: HOSPITALIST

## 2020-10-30 RX ORDER — BISACODYL 10 MG
10 SUPPOSITORY, RECTAL RECTAL
Status: DISCONTINUED | OUTPATIENT
Start: 2020-10-30 | End: 2020-11-04

## 2020-10-30 RX ORDER — ACETAMINOPHEN 325 MG/1
650 TABLET ORAL EVERY 6 HOURS PRN
Status: DISCONTINUED | OUTPATIENT
Start: 2020-10-30 | End: 2020-11-04

## 2020-10-30 RX ORDER — MAGNESIUM HYDROXIDE/ALUMINUM HYDROXICE/SIMETHICONE 120; 1200; 1200 MG/30ML; MG/30ML; MG/30ML
30 SUSPENSION ORAL DAILY PRN
Status: DISCONTINUED | OUTPATIENT
Start: 2020-10-30 | End: 2020-11-04

## 2020-10-30 RX ORDER — ERGOCALCIFEROL (VITAMIN D2) 10 MCG
400 TABLET ORAL DAILY
COMMUNITY

## 2020-10-30 RX ORDER — ENOXAPARIN SODIUM 100 MG/ML
40 INJECTION SUBCUTANEOUS DAILY
Status: DISCONTINUED | OUTPATIENT
Start: 2020-10-30 | End: 2020-11-04

## 2020-10-30 RX ORDER — SIMETHICONE 80 MG
80 TABLET,CHEWABLE ORAL 4 TIMES DAILY PRN
Status: DISCONTINUED | OUTPATIENT
Start: 2020-10-30 | End: 2020-11-04

## 2020-10-30 RX ORDER — CETIRIZINE HYDROCHLORIDE 10 MG/1
10 TABLET ORAL DAILY
COMMUNITY

## 2020-10-30 RX ORDER — CALCIUM CARBONATE 200(500)MG
500 TABLET,CHEWABLE ORAL
Status: DISCONTINUED | OUTPATIENT
Start: 2020-10-30 | End: 2020-11-04

## 2020-10-30 RX ORDER — POLYETHYLENE GLYCOL 3350 17 G/17G
17 POWDER, FOR SOLUTION ORAL DAILY PRN
Status: DISCONTINUED | OUTPATIENT
Start: 2020-10-30 | End: 2020-11-04

## 2020-10-30 RX ORDER — POLYETHYLENE GLYCOL 3350 17 G/17G
17 POWDER, FOR SOLUTION ORAL DAILY PRN
Status: DISCONTINUED | OUTPATIENT
Start: 2020-10-30 | End: 2020-10-30

## 2020-10-30 RX ORDER — BENZONATATE 100 MG/1
100 CAPSULE ORAL 3 TIMES DAILY PRN
Status: DISCONTINUED | OUTPATIENT
Start: 2020-10-30 | End: 2020-11-04

## 2020-10-30 RX ORDER — ONDANSETRON 2 MG/ML
4 INJECTION INTRAMUSCULAR; INTRAVENOUS EVERY 6 HOURS PRN
Status: DISCONTINUED | OUTPATIENT
Start: 2020-10-30 | End: 2020-11-04

## 2020-10-30 RX ORDER — BISACODYL 10 MG
10 SUPPOSITORY, RECTAL RECTAL
Status: DISCONTINUED | OUTPATIENT
Start: 2020-10-30 | End: 2020-10-30

## 2020-10-30 RX ORDER — DEXAMETHASONE SODIUM PHOSPHATE 4 MG/ML
6 VIAL (ML) INJECTION EVERY 24 HOURS
Status: DISCONTINUED | OUTPATIENT
Start: 2020-10-30 | End: 2020-10-31

## 2020-10-30 RX ORDER — DEXTROSE AND SODIUM CHLORIDE 5; .45 G/100ML; G/100ML
INJECTION, SOLUTION INTRAVENOUS CONTINUOUS
Status: ACTIVE | OUTPATIENT
Start: 2020-10-30 | End: 2020-10-30

## 2020-10-30 NOTE — PLAN OF CARE
Problem: Patient/Family Goals  Goal: Patient/Family Long Term Goal  Description: Patient's Long Term Goal: to go home    Interventions:  - IV decadron/ID consult  - See additional Care Plan goals for specific interventions  Outcome: Progressing  Goal: Pa

## 2020-10-30 NOTE — PLAN OF CARE
Pt is AOx4. VSS, afebrile. Maintaining sp02 >91% on RA. C/o SOB at rest and PATEL. Dry, nonproductive cough. Tele - NSR. Verbalized chest discomfort with coughing. Poor appetite, no n/v/d. Continent of bowel and bladder. Last BM 10/28. Voids to bathroom.  Amb INTERVENTIONS:  - Assess for changes in respiratory status  - Assess for changes in mentation and behavior  - Position to facilitate oxygenation and minimize respiratory effort  - Oxygen supplementation based on oxygen saturation or ABGs  - Provide Smoking

## 2020-10-30 NOTE — PROGRESS NOTES
[facility]  Progress Note     Jose Martin Ibarra Patient Status:  Inpatient    1970 MRN [id number]   [facility] 5NW-A Attending Rachele Lackey MD   Hosp Day # 0 PCP Fransisco Stringer MD     Chief Complaint: sob    S: Patient imprv [id number] pneumonia id consult   1. remdesivir and CCP today   2. Weaned off 3L to RA  3. If persistently on RA, consider dc decadron soon  4.  Robitussin for cough   Quality:  · DVT Prophylaxis: lovenox  · CODE status: full  · Rudd: no     Estimated date of

## 2020-10-30 NOTE — ED PROVIDER NOTES
Patient Seen in: [facility] Emergency Department      History   Patient presents with:  Difficulty Breathing    Stated Complaint: cindi    HPI    This is a 80-year-old male complains of shortness of breath, fevers, his wife is not ill.   He presents he Physical Exam    GENERAL: Awake, alert oriented x3, nontoxic appearing. Tachypneic and ill-appearing. SKIN: Normal, warm, and dry. HEENT:  Pupils equally round and reactive to light. Conjuctiva clear.   Oropharynx is clear and moist.   Lungs: Coa PLASMA - Normal   PRO BETA NATRIURETIC PEPTIDE - Normal   TROPONIN I - Normal   CBC WITH DIFFERENTIAL WITH PLATELET    Narrative: The following orders were created for panel order CBC WITH DIFFERENTIAL WITH PLATELET.   Procedure was established of normal saline. Patient's O2 sats were 91% on room air. He was placed on 2 L oxygen by nasal cannula. Basic labs were obtained. CBC: White blood cell count 9.7. Hemoglobin 16.6. Platelet 925. CMP: BUN 17. Creatinine 1.2.   Glucose

## 2020-10-30 NOTE — PLAN OF CARE
Multidisciplinary Discharge Rounds held 10/30/2020. Treatment team members present today include , , Charge Nurse, Nurse and Pharmacy caring for Sheldon Abdullahi.      Other care providers present: n/a    Mobility Goal: Ambulat

## 2020-10-30 NOTE — H&P
[facility]  History and Physical     Piyush Martinez Patient Status:  Emergency    1970 MRN [id number]   Location [address] [facility] Attending Amena Newman, [address] [facility] Day # 0 PCP Donnell Padilla MD     Chief Complain 3.  HEENT: Normocephalic atraumatic. Moist mucous membranes. EOM-I. PERRLA. Anicteric. Respiratory: Clear to auscultation bilaterally. No wheezes. No rhonchi. Cardiovascular: S1, S2. Regular rate and rhythm. No murmurs, rubs or gallops.    Chest and Back:

## 2020-10-30 NOTE — CONSULTS
INFECTIOUS DISEASE [id number] [facility] Patient Status:  Inpatient    1970 MRN [id number]   [facility] 5NW-A Attending Nicole Mathews MD   Hosp Day # 0 PCP Lula Braswell IV bolus 500 mL, 500 mL, Intravenous, PRN  •  acetaminophen (TYLENOL) tab 650 mg, 650 mg, Oral, Q6H PRN  •  PEG 3350 (MIRALAX) powder packet 17 g, 17 g, Oral, Daily PRN  •  bisacodyl (DULCOLAX) rectal suppository 10 mg, 10 mg, Rectal, Daily PRN  •  Enoxapa erythema, no open wounds      Laboratory Data:  Laboratory data reviewed      Recent Labs   Lab 10/30/20  0513   RBC 4.79   HGB 15.6   HCT 43.4   MCV 90.6   MCH 32.6   MCHC 35.9   RDW 11.0   NEPRELIM 5.02   WBC 6.0   .0       Recent Labs   Lab 10/29 on room air  Attempt to wean off 02 reassess need for decadron  ---------Recovery trial showed marked mortality improvement fort those requirning mecahnical ventilation, and a small mortality benefit for those on 02 supplemental, and no benefit for those o

## 2020-10-31 PROCEDURE — 99232 SBSQ HOSP IP/OBS MODERATE 35: CPT | Performed by: HOSPITALIST

## 2020-10-31 PROCEDURE — XW13325 TRANSFUSION OF CONVALESCENT PLASMA (NONAUTOLOGOUS) INTO PERIPHERAL VEIN, PERCUTANEOUS APPROACH, NEW TECHNOLOGY GROUP 5: ICD-10-PCS | Performed by: HOSPITALIST

## 2020-10-31 NOTE — PROGRESS NOTES
[facility]  Progress Note     Jose Martin Ibarra Patient Status:  Inpatient    1970 MRN [id number]   [facility] 5NW-A Attending Rachele Lackey MD   Hosp Day # 1 PCP Fransisco Stringer MD     Chief Complaint: sob    S: Patient imprv mg Intravenous Q24H       ASSESSMENT / PLAN:     1. Acute hypoxic resp failure due to acute covid19 pneumonia id consult   1. remdesivir and CCP   2. RA  3. If persistently on RA, consider dc decadron soon  4. Robitussin for cough   5. Ferritin 1100  6.  Adeel Sin

## 2020-10-31 NOTE — PLAN OF CARE
Patient A&O x4, c/o chest pain from coughing, lungs diminished. Patient showered over night. IV fell out during shower. New IV start, patient to possibly have plasma today.

## 2020-10-31 NOTE — PLAN OF CARE
Problem: COVID      Data: Pt A&Ox4. RA. . Pt c/o cough treated with PRN cough medication. Telemetry monitored. Pt resting comfortably at this time.  Pt and family updated on plan of care and verbalized understanding.      Problem: Patient/Family Goals  G ABGs  - Provide Smoking Cessation handout, if applicable  - Encourage broncho-pulmonary hygiene including cough, deep breathe, Incentive Spirometry  - Assess the need for suctioning and perform as needed  - Assess and instruct to report SOB or any respirat

## 2020-10-31 NOTE — PROGRESS NOTES
[facility]                INFECTIOUS DISEASE PROGRESS NOTE    Sherry Roblero Patient Status:  Inpatient    1970 MRN [id number]   [facility] 5NW-A Attending Kyree Castellanos MD   [address] Day # 1 PCP Paulina Craven MD     Antibi 81 72 72   AST 51* 40* 38*   ALT 40 35 35   BILT 0.8 0.6 0.5   TP 8.2 7.6 7.7       No results found for: [facility] Encounter on 10/29/20   1.  BLOOD CULTURE     Status: None (Preliminary result)    Collection Time: 10/30/20  1:12 AM

## 2020-11-01 PROCEDURE — 99232 SBSQ HOSP IP/OBS MODERATE 35: CPT | Performed by: HOSPITALIST

## 2020-11-01 RX ORDER — BENZONATATE 100 MG/1
100 CAPSULE ORAL 3 TIMES DAILY PRN
Qty: 90 CAPSULE | Refills: 0 | Status: SHIPPED | OUTPATIENT
Start: 2020-11-01 | End: 2021-06-29

## 2020-11-01 RX ORDER — CODEINE PHOSPHATE AND GUAIFENESIN 10; 100 MG/5ML; MG/5ML
5 SOLUTION ORAL EVERY 4 HOURS PRN
Status: DISCONTINUED | OUTPATIENT
Start: 2020-11-01 | End: 2020-11-04

## 2020-11-01 RX ORDER — CODEINE PHOSPHATE AND GUAIFENESIN 10; 100 MG/5ML; MG/5ML
5 SOLUTION ORAL EVERY 4 HOURS PRN
Qty: 118 ML | Refills: 0 | Status: SHIPPED | OUTPATIENT
Start: 2020-11-01 | End: 2020-11-15

## 2020-11-01 NOTE — PROGRESS NOTES
[facility]                INFECTIOUS DISEASE PROGRESS NOTE    Martina Garcia Patient Status:  Inpatient    1970 MRN [id number]   [facility] 5NW-A Attending Tyler Lane MD   [facility] Day # 2 PCP Raquel Benson MD     Antibi 31   ALT 35 35 36   BILT 0.6 0.5 0.5   TP 7.6 7.7 6.9       No results found for: [facility] Encounter on 10/29/20   1.  BLOOD CULTURE     Status: None (Preliminary result)    Collection Time: 10/30/20  1:12 AM    Specimen: Blood,periph

## 2020-11-01 NOTE — PLAN OF CARE
Received patient A&O x 4, pleasant. VSS. Maintaining O2 sats on RA >92% while awake, dropped to 88-89% while asleep. Encouraged to cough, DB, IS, and prone. NSR, tele. Pt received convalescent plasma, tolerated well. Up at isabella. Reg diet.  Dry cough treated comfort level or patient's stated pain goal  Description: INTERVENTIONS:  - Encourage pt to monitor pain and request assistance  - Assess pain using appropriate pain scale  - Administer analgesics based on type and severity of pain and evaluate response  -

## 2020-11-01 NOTE — PROGRESS NOTES
[facility]  Progress Note     Martinasanam Garcia Patient Status:  Inpatient    1970 MRN [id number]   [facility] 5NW-A Attending Tyler Lane MD   Hosp Day # 2 PCP Raquel Benson MD     Chief Complaint: sob    S: Patient imprv ASSESSMENT / PLAN:     1. Acute hypoxic resp failure due to acute covid19 pneumonia id consult   1. remdesivir and CCP   2. If persistently on RA, consider dc decadron soon  3. Robitussin for cough   4. Ferritin 1100  5. Cont remdesivir  6.  [place]

## 2020-11-01 NOTE — PROGRESS NOTES
Patient has SOB, difficulty recovering O2 saturations post-ambulation. States his Shanika Candle makes it difficult to breath\". Denies PRN cough medications at this time. Placed pt 1-2L - unable to improve O2 sats >88%. Currently on 3L maintaining 92%. WCTM.

## 2020-11-02 PROCEDURE — 99232 SBSQ HOSP IP/OBS MODERATE 35: CPT | Performed by: HOSPITALIST

## 2020-11-02 RX ORDER — DOCUSATE SODIUM 100 MG/1
100 CAPSULE, LIQUID FILLED ORAL 2 TIMES DAILY
Status: DISCONTINUED | OUTPATIENT
Start: 2020-11-02 | End: 2020-11-04

## 2020-11-02 RX ORDER — POTASSIUM CHLORIDE 20 MEQ/1
40 TABLET, EXTENDED RELEASE ORAL EVERY 4 HOURS
Status: COMPLETED | OUTPATIENT
Start: 2020-11-02 | End: 2020-11-02

## 2020-11-02 NOTE — PLAN OF CARE
Received patient A&O x 4. VSS. Maintaining O2 saturations >90% on 2-3L, increased O2 needs during sleep. Received pt on RA. Dyspnea on exertion. Facial flushing, discomfort, and severe headache noted. Painful cough. Low-grade fevers.  Pt received PRN aceta appropriate  Outcome: Progressing     Problem: RESPIRATORY - ADULT  Goal: Achieves optimal ventilation and oxygenation  Description: INTERVENTIONS:  - Assess for changes in respiratory status  - Assess for changes in mentation and behavior  - Position to f

## 2020-11-02 NOTE — PAYOR COMM NOTE
--------------  ADMISSION REVIEW     Payor: [facility]  Subscriber #:  [id number]  Authorization Number: [id number]    Admit date: 10/30/20  Admit time: 0001       Admitting Physician: Stan Hamilton MD  Attending Physician:  Giselle Sharp MD  Primary Ca All other systems reviewed and negative except as noted above.     Physical Exam     ED Triage Vitals [10/29/20 0607]   BP (!) 137/95   Pulse 108   Resp (!) 28   Temp (!) 100.6 °F (38.1 °C)   Temp src Tympanic   SpO2 92 %   O2 Device None (Room air)       C RAPID SARS-COV-2 BY PCR - Abnormal; Notable for the following components:    Rapid SARS-CoV-2 by PCR Detected (*)     All other components within normal limits   CBC W/ DIFFERENTIAL - Abnormal; Notable for the following components:    RDW 10.9 (*)     Neut PROCEDURE:  XR CHEST AP PORTABLE  (CPT=71045)  TECHNIQUE:  AP chest radiograph was obtained.   COMPARISON:  Zhanna Tanner, XR CHEST PA + LAT CHEST (FFC=04885), 8/02/2018, 4:49 PM.  Sofy, XR, XR CHEST PA + LAT CHEST (CPT=71020), 2/01/2017, 4:33 PM.  IND Pneumonia due to COVID-19 virus  (primary encounter diagnosis)  Hypoxia    Disposition:  Admit  10/29/2020  9:39 pm    Follow-up:  No follow-up provider specified.         Medications Prescribed:  Current Discharge Medication List                          P Social History:  He has never used smokeless tobacco. He reports that he does not use drugs. Family History: hypertension    Allergies: No Known Allergies    Medications:  No current facility-administered medications on file prior to encounter.      •  O 1. 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 His boss had been recently diagnosed with covid. Patient had multiple exposures to his boss, in his office.  Patient has been wearing a mask, but his boss does •  Alum & Mag Hydroxide-Simeth (MAALOX) oral suspension 30 mL, 30 mL, Oral, Daily PRN  •  benzocaine-menthol (CEPACOL (SUGAR-FREE)) 1 lozenge, 1 lozenge, Oral, PRN  •  benzonatate (TESSALON) cap 100 mg, 100 mg, Oral, TID PRN  •  Calcium Carbonate Antacid ( ALB 3.2* 2.8*   * 134*   K 3.7 3.9   CL 97* 102   CO2 22.0 24.0   ALKPHO 81 72   AST 51* 40*   ALT 40 35   BILT 0.8 0.6   TP 8.2 7.6      COVID-19 Lab Results     COVID-19  Lab Results   Component Value Date     COVID19 Detected (A) 10/29/2020     CO    Consider CCP, under EUA, symptoms 7 days, and severe illness, and mortality benefiit for high titre over low titre; blood bank unable to titire so patient informed that unkown beneifit, but agrees.           Darnell Toledo MD  [address] 1939 10/30/20  0513   TROP <0.045 <0.045   PBNP 15  --          Creatinine Kinase      Recent Labs   Lab 10/29/20  1939   *         Inflammatory Markers         Recent Labs   Lab 10/29/20  1939 10/29/20  1956 10/30/20  0513 10/31/20  0528   CRP 14.30 ED to Hosp-Admission (Current) on 10/29/2020        Detailed Report        Note viewed by patient  Chart Review: Note Routing History    No routing history on file.   11/1  Suni Benitez MD   Physician   Infectious Disease   Progress Notes      Signed HCT 43.0   MCV 95.1   MCH 32.3   MCHC 34.0   RDW 11.4   NEPRELIM 7.39   WBC 9.6   .0                  Recent Labs   Lab 10/30/20  0513 10/31/20  0528 11/01/20  0601   * 141* 102*   BUN 16 18 19*   CREATSERUM 1.00 1.03 0.91   GFRAA 101 97 113  1970 MRN [id number]   [facility] 5NW-A Attending Kyree Castellanos MD   Hosp Day # 3 PCP Paulina Craven MD      Antibiotics: Remdesivir#3     Subjective:  Resting on 02 was 88% on 2L increased to 3L sat 93%  Objective:  Temp:  [99 °F (37 No results found for: [facility]  Microbiology     Recent Results         Hospital Encounter on 10/29/20   1.  BLOOD CULTURE     Status: None (Preliminary result)     Collection Time: 10/30/20  1:12 AM     Specimen: Blood,peripheral   Result Value Ref Range

## 2020-11-02 NOTE — PROGRESS NOTES
[facility]  Progress Note     Sheldon Abdullahi Patient Status:  Inpatient    1970 MRN [id number]   [facility] 5NW-A Attending Tedd Lesch, MD   Hosp Day # 3 PCP Esther Torres MD     Chief Complaint: sob    S: Patient worse 40 mg Subcutaneous Daily   • remdesivir IVPB  100 mg Intravenous Q24H       ASSESSMENT / PLAN:     1. Acute hypoxic resp failure due to acute covid19 pneumonia id consult   1. remdesivir and CCP   2. Robitussin for cough   3. Ferritin 1100  4.  Cont remdesi

## 2020-11-02 NOTE — PLAN OF CARE
Pt is AOx4, fatigued. Malaise. VSS, low grade fevers. Maintaining sp02 >90% on 2L. Wean as tolerated. Dry, nonproductive cough- robitussin PRN given for relief. Tele - NSR. C/o generalized body aches, PRN tylenol given. Poor appetite. no n/v/d.  Continent o Manage/alleviate anxiety  - Utilize distraction and/or relaxation techniques  - Monitor for opioid side effects  - Notify MD/LIP if interventions unsuccessful or patient reports new pain  - Anticipate increased pain with activity and pre-medicate as approp

## 2020-11-02 NOTE — PROGRESS NOTES
[facility]                INFECTIOUS DISEASE PROGRESS NOTE    Ronn Alvarez Patient Status:  Inpatient    1970 MRN [id number]   [facility] 5NW-A Attending Heather Tirado MD   [facility] Day # 3 PCP Samra Austin MD     Antibi 31 30   ALT 35 36 42   BILT 0.5 0.5 0.8   TP 7.7 6.9 7.0       No results found for: [facility] Encounter on 10/29/20   1.  BLOOD CULTURE     Status: None (Preliminary result)    Collection Time: 10/30/20  1:12 AM    Specimen: Blood,per

## 2020-11-02 NOTE — PLAN OF CARE
Multidisciplinary Discharge Rounds held 11/2/2020. Treatment team members present today include , , Charge Nurse, Nurse caring for Sheldon Abdullahi.      Other care providers present: n/a    Mobility Goal: Ambulate safely while

## 2020-11-02 NOTE — PLAN OF CARE
Problem: COVID      Data: Pt A&Ox4. RA. . Pt c/o cough treated with PRN cough medication. Telemetry monitored. Pt resting comfortably at this time. IV Remdesivir. Pt felt very uncomfortable this afternoon, low-grade fever. PRN tylenol given.  Pt and fami status  - Assess for changes in mentation and behavior  - Position to facilitate oxygenation and minimize respiratory effort  - Oxygen supplementation based on oxygen saturation or ABGs  - Provide Smoking Cessation handout, if applicable  - Encourage bron

## 2020-11-03 PROCEDURE — 99232 SBSQ HOSP IP/OBS MODERATE 35: CPT | Performed by: HOSPITALIST

## 2020-11-03 NOTE — PROGRESS NOTES
[facility]                INFECTIOUS DISEASE PROGRESS NOTE    Alethea Albright Patient Status:  Inpatient    1970 MRN [id number]   [facility] 5NW-A Attending Edmar Cortez MD   [facility] Day # 4 PCP Lula Braswell MD     Antibi 134*   K 3.7 3.5 4.6 4.4    101  --  105   CO2 26.0 26.0  --  22.0   ALKPHO 64 68  --  71   AST 31 30  --  22   ALT 36 42  --  46   BILT 0.5 0.8  --  0.6   TP 6.9 7.0  --  7.6       No results found for: [facility] Encounter on 1

## 2020-11-03 NOTE — PLAN OF CARE
Pt is AOx4. VSS, afebrile. Maintaining sp02 >90% on RA while awake. SOB and PATEL. Non-productive cough. Reports dizziness with ambulation and sitting up in chair. Encouraged to call for assistance. Call light within reach. Also c/o generalized body aches. evaluate response  - Implement non-pharmacological measures as appropriate and evaluate response  - Consider cultural and social influences on pain and pain management  - Manage/alleviate anxiety  - Utilize distraction and/or relaxation techniques  - Monit RN  Outcome: Progressing  11/3/2020 1100 by Aurea Solorzano RN  Outcome: Progressing

## 2020-11-03 NOTE — CM/SW NOTE
Care Progression Note:  Active Acute Medical Issue:   Pneumonia due to COVID-19 virus     Patient borderlines hypoxia, dropped to 89% while talking. Inflammatory markers up.     Redesivir, decadron, sp plasma, on 2L overnight, RA currently during the day pe

## 2020-11-03 NOTE — PROGRESS NOTES
[facility]  Progress Note     Santosh Anishs Patient Status:  Inpatient    1970 MRN [id number]   [facility] 5NW-A Attending Bella Austin MD   [facility] Day # 4 PCP Nando Sullivan MD     Chief Complaint: sob    S: Patient borde reviewed in Epic. Medications:   • dexamethasone  6 mg Oral Daily   • docusate sodium  100 mg Oral BID   • enoxaparin  40 mg Subcutaneous Daily       ASSESSMENT / PLAN:     1.  Acute hypoxic resp failure due to acute covid19 pneumonia id consult   1. rem

## 2020-11-03 NOTE — PLAN OF CARE
Problem: Patient/Family Goals  Goal: Patient/Family Long Term Goal  Description: Patient's Long Term Goal: To discharge home with adequate resources    Interventions:  - Follow plan of care  - IV remdesevir, CCP  - Supplemental O2  - See additional Care supplementation based on oxygen saturation or ABGs  - Provide Smoking Cessation handout, if applicable  - Encourage broncho-pulmonary hygiene including cough, deep breathe, Incentive Spirometry  - Assess the need for suctioning and perform as needed  - Ass

## 2020-11-03 NOTE — PLAN OF CARE
Received patient A&O x 4. VSS. Maintaining O2 saturations >90% on 2L, increased O2 needs during sleep. Received patient on RA. Dyspnea on exertion. Painful cough. Afebrile. Light headedness reported at times upon ambulation.  Pt encouraged to use call lig interventions unsuccessful or patient reports new pain  - Anticipate increased pain with activity and pre-medicate as appropriate  Outcome: Progressing     Problem: RESPIRATORY - ADULT  Goal: Achieves optimal ventilation and oxygenation  Description: INTER

## 2020-11-03 NOTE — PLAN OF CARE
Multidisciplinary Discharge Rounds held 11/3/2020. Treatment team members present today include , , Charge Nurse, Nurse caring for Sheldon Abdullahi.      Other care providers present: n/a    Mobility Goal: Ambulate safely witho

## 2020-11-03 NOTE — PAYOR COMM NOTE
--------------  CONTINUED STAY REVIEW    Payor: [facility]  Subscriber #:  [id number]  Authorization Number: [id number]    Admit date: 10/30/20  Admit time: 0001 FAXING CLINICAL UPDATE FOR 11/3/20    11/3/20   Chief Complaint: sob     S: Patient borderl ADMINISTERED IN LAST 1 DAY:  acetaminophen (TYLENOL) tab 650 mg     Date Action Dose Route User    11/3/2020 1047 Given 650 mg Oral Martha Natalio, [facility]    11/3/2020 0450 Given 650 mg Oral Etha BANDAR Naranjo    11/2/2020 2127 Given 650 mg Oral Maria Luisa Richard,

## 2020-11-04 VITALS
SYSTOLIC BLOOD PRESSURE: 114 MMHG | WEIGHT: 230 LBS | HEART RATE: 101 BPM | OXYGEN SATURATION: 93 % | DIASTOLIC BLOOD PRESSURE: 75 MMHG | TEMPERATURE: 98 F | RESPIRATION RATE: 18 BRPM | BODY MASS INDEX: 34.07 KG/M2 | HEIGHT: 69 IN

## 2020-11-04 PROCEDURE — 99239 HOSP IP/OBS DSCHRG MGMT >30: CPT | Performed by: HOSPITALIST

## 2020-11-04 RX ORDER — DEXAMETHASONE 6 MG/1
6 TABLET ORAL DAILY
Qty: 6 TABLET | Refills: 0 | Status: SHIPPED | OUTPATIENT
Start: 2020-11-05 | End: 2020-11-11

## 2020-11-04 NOTE — PROGRESS NOTES
NURSING DISCHARGE NOTE    Discharged Home via Wheelchair. Accompanied by Support staff  Belongings Taken by patient/family. Pt assessed and ready for dc. Iv dc'd. Instructions gone over w/ pt, no further questions. Dc home.

## 2020-11-04 NOTE — PROGRESS NOTES
[facility]                INFECTIOUS DISEASE PROGRESS NOTE    Elyssa Demarco Patient Status:  Inpatient    1970 MRN [id number]   [facility] 5NW-A Attending Isrrael Duffy MD   [facility] Day # 5 PCP Shikha Denson MD     Antibi 8.6  --  9.2 9.6   ALB 2.6*  --  2.7* 2.6*   *  --  134* 137   K 3.5 4.6 4.4 4.2     --  105 105   CO2 26.0  --  22.0 24.0   ALKPHO 68  --  71 71   AST 30  --  22 28   ALT 42  --  46 54   BILT 0.8  --  0.6 0.5   TP 7.0  --  7.6 7.3       No res

## 2020-11-04 NOTE — PAYOR COMM NOTE
--------------  CONTINUED STAY REVIEW    Payor: [employer]  Subscriber #:  [id number]  Authorization Number: [id number]    Admit date: 10/30/20  Admit time: 0001    Admitting Physician: Tamica Reynoso MD  Attending Physician:  Noreen Morales*  P 11/4/2020 0830 Given 6 mg Oral Melissa Arellano RN    11/3/2020 1046 Given 6 mg Oral Evan Redmond RN            Plan: 11/4  Chuy Conteh MD   Physician   Infectious Disease   Progress Notes      Signed   Date of Service:  11/4/2020  8:39 A Recent Labs   Lab 11/04/20  0548   RBC 4.81   HGB 15.4   HCT 45.3   MCV 94.2   MCH 32.0   MCHC 34.0   RDW 11.2   NEPRELIM 11.44*   WBC 14.7*   .0            Recent Labs   Lab 11/02/20 0533 11/02/20 2053 11/03/20 0534 11/04/20 0548   *  --

## 2020-11-04 NOTE — PLAN OF CARE
Problem: Patient/Family Goals  Goal: Patient/Family Long Term Goal  Description: Patient's Long Term Goal: To discharge home with adequate resources    Interventions:  - Follow plan of care  - IV remdesevir, CCP  - Supplemental O2  - See additional Care Position to facilitate oxygenation and minimize respiratory effort  - Oxygen supplementation based on oxygen saturation or ABGs  - Provide Smoking Cessation handout, if applicable  - Encourage broncho-pulmonary hygiene including cough, deep breathe, Incent

## 2020-11-04 NOTE — PLAN OF CARE
A/O x4. Vital signs stable. Tolerating RA with saturations of >92%. Strong, dry cough. PRN antitussives given. Complaints of generalized weakness, PRN tylenol given with relief. IS encouraged. Tele-NSR. Voiding freely. Up standby. IV remdesivir completed. Monitor for signs/symptoms of CO2 retention  Outcome: Progressing

## 2020-11-04 NOTE — DIETARY NOTE
[facility]  NUTRITION FOLLOW-UP ASSESSMENT    Pt does not meet malnutrition criteria.     NUTRITION DIAGNOSIS/PROBLEM:  Inadequate oral intake related to physiological causes as evidenced by MST score of 3, unintentional weight loss and no appetite due meals recorded as of 10/30; per RN, PO intake improving  Intake Meeting Needs: Marginal, added supplements  Food Allergies: No  Cultural/Ethnic/Adventist Preferences Addresses: Yes    NUTRITION RELATED PHYSICAL FINDINGS:  1. Body Fat/Muscle Mass: ASIM d/t C

## 2020-11-05 ENCOUNTER — TELEPHONE (OUTPATIENT)
Dept: FAMILY MEDICINE CLINIC | Facility: CLINIC | Age: 50
End: 2020-11-05

## 2020-11-05 ENCOUNTER — PATIENT OUTREACH (OUTPATIENT)
Dept: CASE MANAGEMENT | Age: 50
End: 2020-11-05

## 2020-11-05 DIAGNOSIS — Z02.9 ENCOUNTERS FOR ADMINISTRATIVE PURPOSE: ICD-10-CM

## 2020-11-05 NOTE — TELEPHONE ENCOUNTER
Patient discharged 11/4/2020, +COVID-19. Pt will need a f/u call and possible televisit or virtual visit. TCM/HFU appt recommended by (11/6/2020—within 2 days of discharge) as pt is a high risk for readmission.  Please discuss with PCP and follow up with p

## 2020-11-05 NOTE — PROGRESS NOTES
NCM placed call to patient for TCM, LM requesting a call to [phone] back with a condition update.

## 2020-11-05 NOTE — PROGRESS NOTES
LMTCB for post hospital follow up, NCM contact information provided. [facility] also sent TE for virtual HFU appt.

## 2020-11-06 ENCOUNTER — TELEMEDICINE (OUTPATIENT)
Dept: FAMILY MEDICINE CLINIC | Facility: CLINIC | Age: 50
End: 2020-11-06
Payer: COMMERCIAL

## 2020-11-06 ENCOUNTER — TELEPHONE (OUTPATIENT)
Dept: FAMILY MEDICINE CLINIC | Facility: CLINIC | Age: 50
End: 2020-11-06

## 2020-11-06 DIAGNOSIS — J12.82 PNEUMONIA DUE TO COVID-19 VIRUS: Primary | ICD-10-CM

## 2020-11-06 DIAGNOSIS — U07.1 PNEUMONIA DUE TO COVID-19 VIRUS: Primary | ICD-10-CM

## 2020-11-06 PROCEDURE — 99214 OFFICE O/P EST MOD 30 MIN: CPT | Performed by: FAMILY MEDICINE

## 2020-11-06 RX ORDER — AZITHROMYCIN 250 MG/1
TABLET, FILM COATED ORAL
Qty: 6 TABLET | Refills: 0 | Status: SHIPPED | OUTPATIENT
Start: 2020-11-06 | End: 2020-11-11

## 2020-11-06 NOTE — TELEPHONE ENCOUNTER
Taniya Persaud, spouse, called, Questions about aspirin that they have at home-how much to give pt.   Please call spouse at [phone]

## 2020-11-06 NOTE — TELEPHONE ENCOUNTER
Halima Barber verbally {consents to/ a Virtual/Telephone Check-In service on 11/06/2020  Patient understands and accepts financial responsibility for any deductible, co-insurance and/or co-pays associated with this service

## 2020-11-06 NOTE — TELEPHONE ENCOUNTER
Pt is still taking Dexamethasone (6 days, started yesterday), can he take with tylenol and aspirin? When can he take he Aspirin, ok with other meds or separate them? Can he start the zpak with all med? Wife called with these additional questions.

## 2020-11-06 NOTE — PROGRESS NOTES
My Chart/ Video/Telephone Visit Check-In Due to Regis verbally consents a video Check-In service on 11/06/20.   Patient understands and accepts financial responsibility for any deductible, co-insurance and/or co-pays associa Smoking status: Former Smoker        Packs/day: 0.50        Years: 20.00        Pack years: 10        Types: Cigarettes        Quit date: 2000        Years since quittin.8      Smokeless tobacco: Never Used    Alcohol use: Yes      Frequency: [employer]

## 2020-11-06 NOTE — TELEPHONE ENCOUNTER
Yes, Roseline Joseph and I talked about the Dexamethasone. He can take the Tylenol as needed. I asked that Roseline Joseph take the Aspirin at a different time then the Dexamethasone. He can take the Zpak with all the other meds.   Thanks

## 2020-11-09 ENCOUNTER — TELEMEDICINE (OUTPATIENT)
Dept: FAMILY MEDICINE CLINIC | Facility: CLINIC | Age: 50
End: 2020-11-09
Payer: COMMERCIAL

## 2020-11-09 ENCOUNTER — TELEPHONE (OUTPATIENT)
Dept: FAMILY MEDICINE CLINIC | Facility: CLINIC | Age: 50
End: 2020-11-09

## 2020-11-09 DIAGNOSIS — U07.1 PNEUMONIA DUE TO COVID-19 VIRUS: Primary | ICD-10-CM

## 2020-11-09 DIAGNOSIS — J12.82 PNEUMONIA DUE TO COVID-19 VIRUS: Primary | ICD-10-CM

## 2020-11-09 PROCEDURE — 99213 OFFICE O/P EST LOW 20 MIN: CPT | Performed by: FAMILY MEDICINE

## 2020-11-09 RX ORDER — ALBUTEROL SULFATE 90 UG/1
2 AEROSOL, METERED RESPIRATORY (INHALATION) EVERY 4 HOURS PRN
Qty: 1 INHALER | Refills: 0 | Status: SHIPPED | OUTPATIENT
Start: 2020-11-09

## 2020-11-09 NOTE — TELEPHONE ENCOUNTER
Marcelo Villalobos verbally {consents to a [employer] on 11/09/2020.   Patient understands and accepts financial responsibility for any deductible, co-insurance and/or co-pays associated with this service

## 2020-11-09 NOTE — PROGRESS NOTES
My Chart/ Video/Telephone Visit Check-In Due to Regis verbally consents a video Check-In service on 11/09/20.   Patient understands and accepts financial responsibility for any deductible, co-insurance and/or co-pays associa at [facility]   • HEMORRHOIDECTOMY      Ligation   • OTHER SURGICAL HISTORY      neck cystectomy   • OTHER SURGICAL HISTORY      tendon repair in hand '94   • OTHER SURGICAL HISTORY      foot surgery '92      History reviewed.  No pertinent fa relationship, due to the ongoing public health crisis/national emergency and because of restrictions of visitation. There are limitations of this visit as no physical exam could be performed.   Every conscious effort was taken to allow for sufficient and a

## 2020-11-10 NOTE — PROGRESS NOTES
Several attempts made to reach the patient with no return call. Patient completed HFU on 11-9-2020. Closing encounter.

## 2020-11-11 NOTE — DISCHARGE SUMMARY
[facility]  DISCHARGE SUMMARY     Carmella Ramirez Patient Status:  Inpatient    1970 MRN [id number]   [facility] 5NW-A Attending No att. providers found   Hosp Day # 5 PCP Leatha Patten MD     Date of Admission: 10/29/2020 Soln  Commonly known as: ROBITUSSIN AC      Take 5 mL by mouth every 4 (four) hours as needed for cough.    Stop taking on: November 15, 2020  Quantity: 118 mL  Refills: 0        CONTINUE taking these medications      Instructions Prescription details   cet

## 2020-11-20 ENCOUNTER — PATIENT MESSAGE (OUTPATIENT)
Dept: FAMILY MEDICINE CLINIC | Facility: CLINIC | Age: 50
End: 2020-11-20

## 2020-11-20 NOTE — TELEPHONE ENCOUNTER
From: Alethea Albright  To:  Lula Braswell MD  Sent: 11/20/2020 3:34 PM CST  Subject: Visit Follow-up Question    Good afternoon Dr Litzy Moore,  I would like to give you updates regarding my current condition, as follows:  I have been not coughing the last

## 2020-12-01 ENCOUNTER — TELEPHONE (OUTPATIENT)
Dept: FAMILY MEDICINE CLINIC | Facility: CLINIC | Age: 50
End: 2020-12-01

## 2020-12-01 NOTE — TELEPHONE ENCOUNTER
[employer] case management is calling to \"close some gaps\" in patient's care. They need to speak with either a nurse or MA. Please call back.

## 2020-12-01 NOTE — TELEPHONE ENCOUNTER
Spoke with Jessi Olea at [facility]. Need date for last physical, PSA, lipid Panel and flu shot. Advised [employer] patient had all these completed on 10 9 202.

## 2021-06-29 ENCOUNTER — TELEMEDICINE (OUTPATIENT)
Dept: FAMILY MEDICINE CLINIC | Facility: CLINIC | Age: 51
End: 2021-06-29
Payer: COMMERCIAL

## 2021-06-29 DIAGNOSIS — Z87.891 HISTORY OF TOBACCO USE: ICD-10-CM

## 2021-06-29 DIAGNOSIS — J39.2 THROAT IRRITATION: ICD-10-CM

## 2021-06-29 DIAGNOSIS — Z86.16 HISTORY OF COVID-19: ICD-10-CM

## 2021-06-29 DIAGNOSIS — R05.9 COUGH: Primary | ICD-10-CM

## 2021-06-29 PROCEDURE — 99214 OFFICE O/P EST MOD 30 MIN: CPT | Performed by: FAMILY MEDICINE

## 2021-06-29 NOTE — PROGRESS NOTES
My Chart/ Video/Telephone Visit Check-In Due to Regis verbally consents a video Check-In service on 06/29/21.   Patient understands and accepts financial responsibility for any deductible, co-insurance and/or co-pays associa Fa Alive      Social History    Tobacco Use      Smoking status: Former Smoker        Packs/day: 0.50        Years: 20.00        Pack years: 10        Types: Cigarettes        Quit date: 2000        Years since quittin.5      Smokeless tobacco: Ne reviewing labs, medications, radiology tests and decision making.   Appropriate medical decision-making and tests are ordered as detailed in the plan of care above.”

## 2021-07-24 NOTE — TELEPHONE ENCOUNTER
Patient's wife called requesting refill for:     Indomethacin    [facility] [address] [facility] [address] [facility] [address], [phone], [phone]    Please advise # [phone]

## 2021-07-26 RX ORDER — INDOMETHACIN 75 MG/1
75 CAPSULE, EXTENDED RELEASE ORAL 2 TIMES DAILY PRN
Qty: 60 CAPSULE | Refills: 2 | Status: SHIPPED | OUTPATIENT
Start: 2021-07-26 | End: 2022-01-29

## 2021-08-04 ENCOUNTER — TELEMEDICINE (OUTPATIENT)
Dept: FAMILY MEDICINE CLINIC | Facility: CLINIC | Age: 51
End: 2021-08-04
Payer: COMMERCIAL

## 2021-08-04 ENCOUNTER — TELEPHONE (OUTPATIENT)
Dept: FAMILY MEDICINE CLINIC | Facility: CLINIC | Age: 51
End: 2021-08-04

## 2021-08-04 DIAGNOSIS — R05.9 COUGH: ICD-10-CM

## 2021-08-04 DIAGNOSIS — J02.9 SORE THROAT: Primary | ICD-10-CM

## 2021-08-04 PROBLEM — R09.02 HYPOXIA: Status: RESOLVED | Noted: 2020-10-30 | Resolved: 2021-08-04

## 2021-08-04 PROBLEM — U07.1 PNEUMONIA DUE TO COVID-19 VIRUS: Status: RESOLVED | Noted: 2020-10-29 | Resolved: 2021-08-04

## 2021-08-04 PROBLEM — J12.82 PNEUMONIA DUE TO COVID-19 VIRUS: Status: RESOLVED | Noted: 2020-10-29 | Resolved: 2021-08-04

## 2021-08-04 PROCEDURE — 99214 OFFICE O/P EST MOD 30 MIN: CPT | Performed by: FAMILY MEDICINE

## 2021-08-04 RX ORDER — CODEINE PHOSPHATE AND GUAIFENESIN 10; 100 MG/5ML; MG/5ML
5 SOLUTION ORAL 3 TIMES DAILY PRN
Qty: 100 ML | Refills: 0 | Status: SHIPPED | OUTPATIENT
Start: 2021-08-04 | End: 2021-08-18

## 2021-08-04 RX ORDER — AZITHROMYCIN 250 MG/1
TABLET, FILM COATED ORAL
Qty: 6 TABLET | Refills: 0 | Status: SHIPPED | OUTPATIENT
Start: 2021-08-04 | End: 2021-08-09

## 2021-08-04 NOTE — PROGRESS NOTES
My Chart/ Video/Telephone Visit Check-In Due to Regis verbally consents a video Check-In service on 08/04/21.   Patient understands and accepts financial responsibility for any deductible, co-insurance and/or co-pays associa Ligation   • OTHER SURGICAL HISTORY      neck cystectomy   • OTHER SURGICAL HISTORY      tendon repair in hand '94   • OTHER SURGICAL HISTORY      foot surgery '92      No family history on file.    Family Status   Relation Status   • Mo Alive   • Fa A relationship, due to the ongoing public health crisis/national emergency and because of restrictions of visitation. There are limitations of this visit as no physical exam could be performed.   Every conscious effort was taken to allow for sufficient and a

## 2021-08-04 NOTE — TELEPHONE ENCOUNTER
Pt called he is wanting to know if he could have a VV with Dr today? He has a cough, itchy throat, and body aches.  This all started on Sunday

## 2021-08-10 ENCOUNTER — TELEPHONE (OUTPATIENT)
Dept: FAMILY MEDICINE CLINIC | Facility: CLINIC | Age: 51
End: 2021-08-10

## 2021-08-18 ENCOUNTER — TELEPHONE (OUTPATIENT)
Dept: FAMILY MEDICINE CLINIC | Facility: CLINIC | Age: 51
End: 2021-08-18

## 2021-08-18 RX ORDER — AMOXICILLIN AND CLAVULANATE POTASSIUM 500; 125 MG/1; MG/1
TABLET, FILM COATED ORAL
Qty: 20 TABLET | Refills: 0 | Status: SHIPPED | OUTPATIENT
Start: 2021-08-18 | End: 2021-08-28

## 2021-08-18 NOTE — TELEPHONE ENCOUNTER
SPOUSE CALLED TO ADV THAT  TREATED PT ON 8/4/21 FOR SORE THROAT AND COUGH. ADV GOT BETTER BUT SX'S CAME BACK.  WOULD LIKE TO SEE IF MORE MEDS CAN BE CALLED IN    [facility] [address] [facility] [place]

## 2021-08-20 ENCOUNTER — TELEPHONE (OUTPATIENT)
Dept: FAMILY MEDICINE CLINIC | Facility: CLINIC | Age: 51
End: 2021-08-20

## 2021-08-20 RX ORDER — CODEINE PHOSPHATE AND GUAIFENESIN 10; 100 MG/5ML; MG/5ML
5 SOLUTION ORAL 3 TIMES DAILY PRN
Qty: 100 ML | Refills: 0 | Status: SHIPPED | OUTPATIENT
Start: 2021-08-20 | End: 2021-10-12

## 2021-08-20 NOTE — TELEPHONE ENCOUNTER
From what I could tell he is asking for the cough med you gave him the last time. States he was treated earlier in the month with abx and cough prep, sx essentially resolved but then his wife came down with the same sx.  He then started the sx all over [facility]

## 2021-08-20 NOTE — TELEPHONE ENCOUNTER
Please confirm, when he calls back,  that he is requesting Codeine cough syrup and Tessalon Perles.   Thanks

## 2021-10-12 ENCOUNTER — OFFICE VISIT (OUTPATIENT)
Dept: FAMILY MEDICINE CLINIC | Facility: CLINIC | Age: 51
End: 2021-10-12
Payer: COMMERCIAL

## 2021-10-12 VITALS
HEIGHT: 68 IN | RESPIRATION RATE: 18 BRPM | TEMPERATURE: 97 F | BODY MASS INDEX: 34.86 KG/M2 | HEART RATE: 82 BPM | OXYGEN SATURATION: 98 % | WEIGHT: 230 LBS | DIASTOLIC BLOOD PRESSURE: 80 MMHG | SYSTOLIC BLOOD PRESSURE: 124 MMHG

## 2021-10-12 DIAGNOSIS — Z00.00 WELL ADULT EXAM: Primary | ICD-10-CM

## 2021-10-12 DIAGNOSIS — Z12.5 PROSTATE CANCER SCREENING: ICD-10-CM

## 2021-10-12 DIAGNOSIS — E78.5 HYPERLIPIDEMIA, UNSPECIFIED HYPERLIPIDEMIA TYPE: ICD-10-CM

## 2021-10-12 PROCEDURE — 84460 ALANINE AMINO (ALT) (SGPT): CPT | Performed by: FAMILY MEDICINE

## 2021-10-12 PROCEDURE — 3008F BODY MASS INDEX DOCD: CPT | Performed by: FAMILY MEDICINE

## 2021-10-12 PROCEDURE — 85027 COMPLETE CBC AUTOMATED: CPT | Performed by: FAMILY MEDICINE

## 2021-10-12 PROCEDURE — 90686 IIV4 VACC NO PRSV 0.5 ML IM: CPT | Performed by: FAMILY MEDICINE

## 2021-10-12 PROCEDURE — 90471 IMMUNIZATION ADMIN: CPT | Performed by: FAMILY MEDICINE

## 2021-10-12 PROCEDURE — 80048 BASIC METABOLIC PNL TOTAL CA: CPT | Performed by: FAMILY MEDICINE

## 2021-10-12 PROCEDURE — 3079F DIAST BP 80-89 MM HG: CPT | Performed by: FAMILY MEDICINE

## 2021-10-12 PROCEDURE — 80061 LIPID PANEL: CPT | Performed by: FAMILY MEDICINE

## 2021-10-12 PROCEDURE — 3074F SYST BP LT 130 MM HG: CPT | Performed by: FAMILY MEDICINE

## 2021-10-12 PROCEDURE — 84153 ASSAY OF PSA TOTAL: CPT | Performed by: FAMILY MEDICINE

## 2021-10-12 PROCEDURE — 84450 TRANSFERASE (AST) (SGOT): CPT | Performed by: FAMILY MEDICINE

## 2021-10-12 PROCEDURE — 84443 ASSAY THYROID STIM HORMONE: CPT | Performed by: FAMILY MEDICINE

## 2021-10-12 PROCEDURE — 99396 PREV VISIT EST AGE 40-64: CPT | Performed by: FAMILY MEDICINE

## 2021-10-12 NOTE — PROGRESS NOTES
Ellen Ibarra is a 46year old male.     CC:  CPX    HPI:  Yearly PX    Last lipid:  Lab Results   Component Value Date    CHOLEST 250 (H) 10/09/2020    TRIG 202 (H) 10/09/2020    HDL 51 10/09/2020     (H) 10/09/2020    VLDL 40 (H) 10/09/2020 Acids (FISH OIL OR) Take  by mouth.           History:  Past Medical History:   Diagnosis Date   • Allergic rhinitis    • Hyperlipidemia    • Neuropathy       Past Surgical History:   Procedure Laterality Date   • COLONOSCOPY,DIAGNOSTIC N/A 6/26/2015    Pro nontender, no G/R/R   PSYCH: alert and oriented x 3; affect appropriate  SKIN: not examined  BREAST: not examined/not applicable  EXTREMITIES: No clubbing, cyanosis or edema  RECTAL: not examined  GENITAL: not examined  LYMPH: no supraclavicular nodes  MUS PRESERVATIVE FREE 0.5 ML  CT CALCIUM SCORING    Meds & Refills for this Visit:  Requested Prescriptions      No prescriptions requested or ordered in this encounter         No follow-ups on file.       Authorized by Leatha Patten M.D.

## 2021-11-11 ENCOUNTER — TELEPHONE (OUTPATIENT)
Dept: FAMILY MEDICINE CLINIC | Facility: CLINIC | Age: 51
End: 2021-11-11

## 2021-12-07 ENCOUNTER — TELEPHONE (OUTPATIENT)
Dept: FAMILY MEDICINE CLINIC | Facility: CLINIC | Age: 51
End: 2021-12-07

## 2021-12-07 ENCOUNTER — PATIENT MESSAGE (OUTPATIENT)
Dept: FAMILY MEDICINE CLINIC | Facility: CLINIC | Age: 51
End: 2021-12-07

## 2021-12-07 PROBLEM — I70.0 THORACIC AORTA ATHEROSCLEROSIS (HCC): Status: ACTIVE | Noted: 2021-12-01

## 2021-12-07 PROBLEM — I70.0 THORACIC AORTA ATHEROSCLEROSIS: Status: ACTIVE | Noted: 2021-12-01

## 2021-12-07 RX ORDER — BUDESONIDE AND FORMOTEROL FUMARATE DIHYDRATE 160; 4.5 UG/1; UG/1
AEROSOL RESPIRATORY (INHALATION)
COMMUNITY

## 2021-12-07 NOTE — TELEPHONE ENCOUNTER
Please let patient know or leave message that the CT heart score was zero. Perfect score. This puts a patient at low risk for any significant coronary artery narrowing. However, the can did  plaque buildup in his aorta.  Because of this finding and h

## 2021-12-08 RX ORDER — ATORVASTATIN CALCIUM 10 MG/1
10 TABLET, FILM COATED ORAL DAILY
Qty: 90 TABLET | Refills: 0 | Status: SHIPPED | OUTPATIENT
Start: 2021-12-08

## 2021-12-08 NOTE — TELEPHONE ENCOUNTER
From: Kade Kee  To: Danie Dickersongaurang  Sent: 12/7/2021 8:16 AM CST  Subject: CT heart score    Ramirez Minus,   Dr. Lance Otoole advised that the CT heart score was zero. Perfect score.  This puts a patient at low risk for any significant coronary artery narrowing

## 2022-01-29 ENCOUNTER — PATIENT MESSAGE (OUTPATIENT)
Dept: FAMILY MEDICINE CLINIC | Facility: CLINIC | Age: 52
End: 2022-01-29

## 2022-01-29 RX ORDER — INDOMETHACIN 75 MG/1
CAPSULE, EXTENDED RELEASE ORAL
Qty: 180 CAPSULE | Refills: 0 | Status: SHIPPED | OUTPATIENT
Start: 2022-01-29

## 2022-01-31 NOTE — TELEPHONE ENCOUNTER
From: Jonel Alcocer  To: Silvana Garcia MD  Sent: 1/29/2022 3:58 PM CST  Subject: ENT    Hi Doc,   May I know the contact info for the ent? I lost the number you gave me. If you can please send it to me that would be great.    Thank you

## 2022-02-03 ENCOUNTER — PATIENT MESSAGE (OUTPATIENT)
Dept: FAMILY MEDICINE CLINIC | Facility: CLINIC | Age: 52
End: 2022-02-03

## 2022-02-04 RX ORDER — INDOMETHACIN 75 MG/1
CAPSULE, EXTENDED RELEASE ORAL
Qty: 180 CAPSULE | Refills: 0 | OUTPATIENT
Start: 2022-02-04

## 2022-02-04 RX ORDER — INDOMETHACIN 50 MG/1
50 CAPSULE ORAL
Qty: 90 CAPSULE | Refills: 0 | Status: SHIPPED | OUTPATIENT
Start: 2022-02-04

## 2022-02-04 NOTE — TELEPHONE ENCOUNTER
From: Raf Gloria  To: Serge Del Rio MD  Sent: 2/3/2022 10:19 PM CST  Subject: Indomethacin    Good evening Dr Deedee Ramos,  [facility] but per [place], it will cost me almost $400 after insurance. I tried checking [facility], but [facility] has only the 50 mg with coupon. Will you be able to send the prescription for 50 mg, 3 times daily? Instead of the 75 mg 2 times daily? If so, may you send it to [place] [address] [address] [place] [address]? Please advise. Thank you. Sincerely,  Violeta Strickland. Also, with this Indomethacin, I am experiencing side effects like vomiting and nausea. Please advise. Thank you.

## 2022-02-08 ENCOUNTER — TELEPHONE (OUTPATIENT)
Dept: FAMILY MEDICINE CLINIC | Facility: CLINIC | Age: 52
End: 2022-02-08

## 2022-02-11 ENCOUNTER — TELEMEDICINE (OUTPATIENT)
Dept: FAMILY MEDICINE CLINIC | Facility: CLINIC | Age: 52
End: 2022-02-11
Payer: COMMERCIAL

## 2022-02-11 DIAGNOSIS — M25.562 ACUTE PAIN OF LEFT KNEE: Primary | ICD-10-CM

## 2022-02-11 PROCEDURE — 99214 OFFICE O/P EST MOD 30 MIN: CPT | Performed by: FAMILY MEDICINE

## 2022-02-11 RX ORDER — PREDNISONE 20 MG/1
TABLET ORAL
Qty: 18 TABLET | Refills: 0 | Status: SHIPPED | OUTPATIENT
Start: 2022-02-11 | End: 2022-02-20

## 2022-04-06 ENCOUNTER — PATIENT MESSAGE (OUTPATIENT)
Dept: FAMILY MEDICINE CLINIC | Facility: CLINIC | Age: 52
End: 2022-04-06

## 2022-04-07 ENCOUNTER — PATIENT MESSAGE (OUTPATIENT)
Dept: FAMILY MEDICINE CLINIC | Facility: CLINIC | Age: 52
End: 2022-04-07

## 2022-04-07 ENCOUNTER — MED REC SCAN ONLY (OUTPATIENT)
Dept: FAMILY MEDICINE CLINIC | Facility: CLINIC | Age: 52
End: 2022-04-07

## 2022-04-07 NOTE — TELEPHONE ENCOUNTER
From: Jacinto Ortiz  To: Bob Hannon MD  Sent: 4/6/2022 8:27 PM CDT  Subject: Covid-19 Test order    Good evening Dr Ayesha Rasheed,  I would just like to ask, if you will be able to place an order for me to have a Covid 19 PCR Test for Travel Purposes. I will be traveling, and it is required for me to have a Covid-19 PCR Test. Please advise. Thank you.   Naomy Mccartney

## 2022-04-20 ENCOUNTER — LAB ENCOUNTER (OUTPATIENT)
Dept: LAB | Age: 52
End: 2022-04-20
Attending: FAMILY MEDICINE
Payer: COMMERCIAL

## 2022-04-20 DIAGNOSIS — Z20.822 ENCOUNTER FOR SCREENING LABORATORY TESTING FOR COVID-19 VIRUS IN ASYMPTOMATIC PATIENT: ICD-10-CM

## 2022-04-20 LAB — SARS-COV-2 RNA RESP QL NAA+PROBE: NOT DETECTED

## 2022-08-11 ENCOUNTER — LAB ENCOUNTER (OUTPATIENT)
Dept: LAB | Age: 52
End: 2022-08-11
Attending: FAMILY MEDICINE
Payer: COMMERCIAL

## 2022-08-11 DIAGNOSIS — Z11.52 ENCOUNTER FOR SCREENING FOR COVID-19: ICD-10-CM

## 2022-08-12 LAB — SARS-COV-2 RNA RESP QL NAA+PROBE: NOT DETECTED

## 2022-09-05 ENCOUNTER — PATIENT MESSAGE (OUTPATIENT)
Dept: FAMILY MEDICINE CLINIC | Facility: CLINIC | Age: 52
End: 2022-09-05

## 2022-09-06 NOTE — TELEPHONE ENCOUNTER
Patient advised of the information per Dr. Javier Gallegos. Patient will contact the office if he is unable to print the letter.

## 2022-09-06 NOTE — TELEPHONE ENCOUNTER
Pt states he needs certificate for travel. Can just be a note or a letter stating that he is cleared from having Covid. Needs by 9/24/22. Thank you!

## 2022-09-06 NOTE — TELEPHONE ENCOUNTER
From: Odalys Tovar  To: Mike Price MD  Sent: 9/5/2022 12:12 PM CDT  Subject: Covid 19 Certificate of Recovery    Good morning Dr Elliott Ward,  I would like to inform you that i had a positive PCR covid19 test on 8/27/2022. I would like to ask, if you may be able to provide me a \"certificate of recovery\", which is required for my Travel. My travel starts on Sept 24. Please advise.   Thank you,  Mela Pimentel

## 2022-09-06 NOTE — TELEPHONE ENCOUNTER
Please let patient or caregiver know or leave message that:   I placed a letter into his MyChart  Thanks

## 2022-10-26 ENCOUNTER — OFFICE VISIT (OUTPATIENT)
Dept: FAMILY MEDICINE CLINIC | Facility: CLINIC | Age: 52
End: 2022-10-26
Payer: COMMERCIAL

## 2022-10-26 VITALS
BODY MASS INDEX: 35.51 KG/M2 | TEMPERATURE: 97 F | HEIGHT: 68.5 IN | DIASTOLIC BLOOD PRESSURE: 86 MMHG | WEIGHT: 237 LBS | OXYGEN SATURATION: 97 % | HEART RATE: 70 BPM | SYSTOLIC BLOOD PRESSURE: 126 MMHG

## 2022-10-26 DIAGNOSIS — Z00.00 WELL ADULT EXAM: Primary | ICD-10-CM

## 2022-10-26 DIAGNOSIS — E78.5 HYPERLIPIDEMIA, UNSPECIFIED HYPERLIPIDEMIA TYPE: ICD-10-CM

## 2022-10-26 DIAGNOSIS — Z12.5 PROSTATE CANCER SCREENING: ICD-10-CM

## 2022-10-26 LAB
ALT SERPL-CCNC: 63 U/L
ANION GAP SERPL CALC-SCNC: 7 MMOL/L (ref 0–18)
AST SERPL-CCNC: 44 U/L (ref 15–37)
BUN BLD-MCNC: 16 MG/DL (ref 7–18)
CALCIUM BLD-MCNC: 9.8 MG/DL (ref 8.5–10.1)
CHLORIDE SERPL-SCNC: 106 MMOL/L (ref 98–112)
CHOLEST SERPL-MCNC: 252 MG/DL (ref ?–200)
CO2 SERPL-SCNC: 24 MMOL/L (ref 21–32)
COMPLEXED PSA SERPL-MCNC: 1.12 NG/ML (ref ?–4)
CREAT BLD-MCNC: 1.21 MG/DL
ERYTHROCYTE [DISTWIDTH] IN BLOOD BY AUTOMATED COUNT: 11.2 %
FASTING PATIENT LIPID ANSWER: YES
FASTING STATUS PATIENT QL REPORTED: YES
GFR SERPLBLD BASED ON 1.73 SQ M-ARVRAT: 72 ML/MIN/1.73M2 (ref 60–?)
GLUCOSE BLD-MCNC: 89 MG/DL (ref 70–99)
HCT VFR BLD AUTO: 46.8 %
HDLC SERPL-MCNC: 53 MG/DL (ref 40–59)
HGB BLD-MCNC: 16.5 G/DL
LDLC SERPL CALC-MCNC: 160 MG/DL (ref ?–100)
MCH RBC QN AUTO: 33.6 PG (ref 26–34)
MCHC RBC AUTO-ENTMCNC: 35.3 G/DL (ref 31–37)
MCV RBC AUTO: 95.3 FL
NONHDLC SERPL-MCNC: 199 MG/DL (ref ?–130)
OSMOLALITY SERPL CALC.SUM OF ELEC: 285 MOSM/KG (ref 275–295)
PLATELET # BLD AUTO: 246 10(3)UL (ref 150–450)
POTASSIUM SERPL-SCNC: 3.8 MMOL/L (ref 3.5–5.1)
RBC # BLD AUTO: 4.91 X10(6)UL
SODIUM SERPL-SCNC: 137 MMOL/L (ref 136–145)
TRIGL SERPL-MCNC: 211 MG/DL (ref 30–149)
TSI SER-ACNC: 0.95 MIU/ML (ref 0.36–3.74)
VLDLC SERPL CALC-MCNC: 42 MG/DL (ref 0–30)
WBC # BLD AUTO: 8.4 X10(3) UL (ref 4–11)

## 2022-10-26 PROCEDURE — 99396 PREV VISIT EST AGE 40-64: CPT | Performed by: FAMILY MEDICINE

## 2022-10-26 PROCEDURE — 84443 ASSAY THYROID STIM HORMONE: CPT | Performed by: FAMILY MEDICINE

## 2022-10-26 PROCEDURE — 80048 BASIC METABOLIC PNL TOTAL CA: CPT | Performed by: FAMILY MEDICINE

## 2022-10-26 PROCEDURE — 90471 IMMUNIZATION ADMIN: CPT | Performed by: FAMILY MEDICINE

## 2022-10-26 PROCEDURE — 3008F BODY MASS INDEX DOCD: CPT | Performed by: FAMILY MEDICINE

## 2022-10-26 PROCEDURE — 90686 IIV4 VACC NO PRSV 0.5 ML IM: CPT | Performed by: FAMILY MEDICINE

## 2022-10-26 PROCEDURE — 84450 TRANSFERASE (AST) (SGOT): CPT | Performed by: FAMILY MEDICINE

## 2022-10-26 PROCEDURE — 3079F DIAST BP 80-89 MM HG: CPT | Performed by: FAMILY MEDICINE

## 2022-10-26 PROCEDURE — 84460 ALANINE AMINO (ALT) (SGPT): CPT | Performed by: FAMILY MEDICINE

## 2022-10-26 PROCEDURE — G0103 PSA SCREENING: HCPCS | Performed by: FAMILY MEDICINE

## 2022-10-26 PROCEDURE — 80061 LIPID PANEL: CPT | Performed by: FAMILY MEDICINE

## 2022-10-26 PROCEDURE — 85027 COMPLETE CBC AUTOMATED: CPT | Performed by: FAMILY MEDICINE

## 2022-10-26 PROCEDURE — 3074F SYST BP LT 130 MM HG: CPT | Performed by: FAMILY MEDICINE

## 2022-11-05 ENCOUNTER — PATIENT MESSAGE (OUTPATIENT)
Dept: FAMILY MEDICINE CLINIC | Facility: CLINIC | Age: 52
End: 2022-11-05

## 2022-11-05 NOTE — TELEPHONE ENCOUNTER
From: Juan Madrid  To: Hermilo Jurado MD  Sent: 11/5/2022 11:22 AM CDT  Subject: [facility] Dr Cliff Howard,  I have a friend who is taking Ozempic to help him loose weight. It was prescribed by his MD for this. Will it be possible for you to prescribe Ozempic for me? for me to loose weight? Please advise.   Thank you very much,  Kal Pugh

## 2022-11-06 RX ORDER — PEN NEEDLE, DIABETIC 30 GX3/16"
1 NEEDLE, DISPOSABLE MISCELLANEOUS
Qty: 30 EACH | Refills: 0 | Status: SHIPPED | OUTPATIENT
Start: 2022-11-06

## 2022-11-06 RX ORDER — SEMAGLUTIDE 1.34 MG/ML
0.25 INJECTION, SOLUTION SUBCUTANEOUS
Qty: 1.5 ML | Refills: 0 | Status: SHIPPED | OUTPATIENT
Start: 2022-11-06 | End: 2022-12-04

## 2022-11-23 ENCOUNTER — HOSPITAL ENCOUNTER (OUTPATIENT)
Dept: GENERAL RADIOLOGY | Age: 52
Discharge: HOME | End: 2022-11-23
Attending: FAMILY MEDICINE
Payer: COMMERCIAL

## 2022-11-23 ENCOUNTER — OFFICE VISIT (OUTPATIENT)
Dept: FAMILY MEDICINE CLINIC | Facility: CLINIC | Age: 52
End: 2022-11-23
Payer: COMMERCIAL

## 2022-11-23 VITALS
RESPIRATION RATE: 16 BRPM | DIASTOLIC BLOOD PRESSURE: 74 MMHG | WEIGHT: 240.63 LBS | HEART RATE: 87 BPM | SYSTOLIC BLOOD PRESSURE: 118 MMHG | BODY MASS INDEX: 36 KG/M2 | TEMPERATURE: 97 F | OXYGEN SATURATION: 96 %

## 2022-11-23 DIAGNOSIS — M54.2 NECK PAIN: ICD-10-CM

## 2022-11-23 DIAGNOSIS — M47.812 CERVICAL ARTHRITIS: ICD-10-CM

## 2022-11-23 DIAGNOSIS — M54.2 NECK PAIN: Primary | ICD-10-CM

## 2022-11-23 PROCEDURE — 72050 X-RAY EXAM NECK SPINE 4/5VWS: CPT | Performed by: FAMILY MEDICINE

## 2022-11-23 PROCEDURE — 3074F SYST BP LT 130 MM HG: CPT | Performed by: FAMILY MEDICINE

## 2022-11-23 PROCEDURE — 3078F DIAST BP <80 MM HG: CPT | Performed by: FAMILY MEDICINE

## 2022-11-23 PROCEDURE — 99214 OFFICE O/P EST MOD 30 MIN: CPT | Performed by: FAMILY MEDICINE

## 2022-11-23 RX ORDER — MELOXICAM 15 MG/1
TABLET ORAL
Qty: 90 TABLET | Refills: 0 | OUTPATIENT
Start: 2022-11-23

## 2022-11-23 RX ORDER — CYCLOBENZAPRINE HCL 10 MG
10 TABLET ORAL 3 TIMES DAILY
Qty: 30 TABLET | Refills: 0 | Status: SHIPPED | OUTPATIENT
Start: 2022-11-23 | End: 2022-12-03

## 2022-11-23 RX ORDER — MELOXICAM 15 MG/1
15 TABLET ORAL DAILY
Qty: 30 TABLET | Refills: 0 | Status: SHIPPED | OUTPATIENT
Start: 2022-11-23 | End: 2022-12-23

## 2023-03-01 ENCOUNTER — HOSPITAL ENCOUNTER (OUTPATIENT)
Dept: GENERAL RADIOLOGY | Age: 53
Discharge: HOME | End: 2023-03-01
Attending: FAMILY MEDICINE
Payer: COMMERCIAL

## 2023-03-01 ENCOUNTER — OFFICE VISIT (OUTPATIENT)
Dept: FAMILY MEDICINE CLINIC | Facility: CLINIC | Age: 53
End: 2023-03-01
Payer: COMMERCIAL

## 2023-03-01 VITALS
WEIGHT: 239 LBS | DIASTOLIC BLOOD PRESSURE: 84 MMHG | TEMPERATURE: 97 F | BODY MASS INDEX: 36 KG/M2 | OXYGEN SATURATION: 93 % | SYSTOLIC BLOOD PRESSURE: 110 MMHG | RESPIRATION RATE: 16 BRPM | HEART RATE: 101 BPM

## 2023-03-01 DIAGNOSIS — M53.3 SACROILIAC PAIN: ICD-10-CM

## 2023-03-01 DIAGNOSIS — M54.50 LUMBAR PAIN: Primary | ICD-10-CM

## 2023-03-01 DIAGNOSIS — M54.50 LUMBAR PAIN: ICD-10-CM

## 2023-03-01 PROCEDURE — 72100 X-RAY EXAM L-S SPINE 2/3 VWS: CPT | Performed by: FAMILY MEDICINE

## 2023-03-01 PROCEDURE — 72202 X-RAY EXAM SI JOINTS 3/> VWS: CPT | Performed by: FAMILY MEDICINE

## 2023-03-01 PROCEDURE — 3079F DIAST BP 80-89 MM HG: CPT | Performed by: FAMILY MEDICINE

## 2023-03-01 PROCEDURE — 99214 OFFICE O/P EST MOD 30 MIN: CPT | Performed by: FAMILY MEDICINE

## 2023-03-01 PROCEDURE — 3074F SYST BP LT 130 MM HG: CPT | Performed by: FAMILY MEDICINE

## 2023-03-01 RX ORDER — MELOXICAM 15 MG/1
15 TABLET ORAL DAILY
Qty: 30 TABLET | Refills: 0 | Status: SHIPPED | OUTPATIENT
Start: 2023-03-01 | End: 2023-03-31

## 2023-03-01 RX ORDER — MELOXICAM 15 MG/1
TABLET ORAL
Qty: 90 TABLET | Refills: 0 | OUTPATIENT
Start: 2023-03-01

## 2023-03-08 ENCOUNTER — TELEPHONE (OUTPATIENT)
Dept: PHYSICAL THERAPY | Facility: HOSPITAL | Age: 53
End: 2023-03-08

## 2023-04-21 NOTE — ADDENDUM NOTE
Addended by: Charo Freeman on: 1/16/2017 04:43 PM     Modules accepted: Orders
Addended by: Sunita De Los Santos on: 1/16/2017 04:21 PM     Modules accepted: Orders
38.4

## 2023-06-01 ENCOUNTER — OFFICE VISIT (OUTPATIENT)
Dept: FAMILY MEDICINE CLINIC | Facility: CLINIC | Age: 53
End: 2023-06-01
Payer: COMMERCIAL

## 2023-06-01 VITALS
WEIGHT: 237 LBS | SYSTOLIC BLOOD PRESSURE: 122 MMHG | BODY MASS INDEX: 36 KG/M2 | RESPIRATION RATE: 16 BRPM | HEART RATE: 96 BPM | TEMPERATURE: 97 F | DIASTOLIC BLOOD PRESSURE: 100 MMHG | OXYGEN SATURATION: 97 %

## 2023-06-01 DIAGNOSIS — R53.1 STRENGTH LOSS OF: ICD-10-CM

## 2023-06-01 DIAGNOSIS — M47.812 CERVICAL ARTHRITIS: ICD-10-CM

## 2023-06-01 DIAGNOSIS — M54.12 CERVICAL RADICULOPATHY AT C7: ICD-10-CM

## 2023-06-01 DIAGNOSIS — M50.90 CERVICAL BACK PAIN WITH EVIDENCE OF DISC DISEASE: Primary | ICD-10-CM

## 2023-06-01 PROCEDURE — 3074F SYST BP LT 130 MM HG: CPT | Performed by: FAMILY MEDICINE

## 2023-06-01 PROCEDURE — 99214 OFFICE O/P EST MOD 30 MIN: CPT | Performed by: FAMILY MEDICINE

## 2023-06-01 PROCEDURE — 3080F DIAST BP >= 90 MM HG: CPT | Performed by: FAMILY MEDICINE

## 2023-06-01 RX ORDER — PREDNISONE 20 MG/1
TABLET ORAL
Qty: 18 TABLET | Refills: 0 | Status: SHIPPED | OUTPATIENT
Start: 2023-06-01

## 2023-06-01 RX ORDER — HYDROCODONE BITARTRATE AND ACETAMINOPHEN 5; 325 MG/1; MG/1
1 TABLET ORAL EVERY 8 HOURS PRN
Qty: 10 TABLET | Refills: 0 | Status: SHIPPED | OUTPATIENT
Start: 2023-06-01 | End: 2023-06-04

## 2023-06-18 ENCOUNTER — HOSPITAL ENCOUNTER (OUTPATIENT)
Dept: MRI IMAGING | Age: 53
Discharge: HOME | End: 2023-06-18
Attending: FAMILY MEDICINE
Payer: COMMERCIAL

## 2023-06-18 DIAGNOSIS — M47.812 CERVICAL ARTHRITIS: ICD-10-CM

## 2023-06-18 DIAGNOSIS — R53.1 STRENGTH LOSS OF: ICD-10-CM

## 2023-06-18 DIAGNOSIS — M54.12 CERVICAL RADICULOPATHY AT C7: ICD-10-CM

## 2023-06-18 DIAGNOSIS — M50.90 CERVICAL BACK PAIN WITH EVIDENCE OF DISC DISEASE: ICD-10-CM

## 2023-12-01 ENCOUNTER — IMMUNIZATION (OUTPATIENT)
Dept: FAMILY MEDICINE CLINIC | Facility: CLINIC | Age: 53
End: 2023-12-01
Payer: COMMERCIAL

## 2023-12-01 DIAGNOSIS — Z23 NEED FOR VACCINATION: Primary | ICD-10-CM

## 2023-12-01 PROCEDURE — 90686 IIV4 VACC NO PRSV 0.5 ML IM: CPT | Performed by: FAMILY MEDICINE

## 2023-12-01 PROCEDURE — 90471 IMMUNIZATION ADMIN: CPT | Performed by: FAMILY MEDICINE

## 2023-12-21 ENCOUNTER — OFFICE VISIT (OUTPATIENT)
Dept: FAMILY MEDICINE CLINIC | Facility: CLINIC | Age: 53
End: 2023-12-21
Payer: COMMERCIAL

## 2023-12-21 VITALS
WEIGHT: 245 LBS | RESPIRATION RATE: 16 BRPM | OXYGEN SATURATION: 98 % | SYSTOLIC BLOOD PRESSURE: 118 MMHG | HEART RATE: 108 BPM | DIASTOLIC BLOOD PRESSURE: 80 MMHG | BODY MASS INDEX: 37 KG/M2 | TEMPERATURE: 97 F

## 2023-12-21 DIAGNOSIS — E78.5 HYPERLIPIDEMIA, UNSPECIFIED HYPERLIPIDEMIA TYPE: ICD-10-CM

## 2023-12-21 DIAGNOSIS — Z00.00 WELL ADULT EXAM: Primary | ICD-10-CM

## 2023-12-21 DIAGNOSIS — I70.0 THORACIC AORTA ATHEROSCLEROSIS (HCC): ICD-10-CM

## 2023-12-21 LAB
ALT SERPL-CCNC: 43 U/L
ANION GAP SERPL CALC-SCNC: 5 MMOL/L (ref 0–18)
AST SERPL-CCNC: 24 U/L (ref 15–37)
BUN BLD-MCNC: 16 MG/DL (ref 9–23)
CALCIUM BLD-MCNC: 9.4 MG/DL (ref 8.5–10.1)
CHLORIDE SERPL-SCNC: 107 MMOL/L (ref 98–112)
CHOLEST SERPL-MCNC: 252 MG/DL (ref ?–200)
CO2 SERPL-SCNC: 26 MMOL/L (ref 21–32)
COMPLEXED PSA SERPL-MCNC: 1.3 NG/ML (ref ?–4)
CREAT BLD-MCNC: 1.11 MG/DL
EGFRCR SERPLBLD CKD-EPI 2021: 79 ML/MIN/1.73M2 (ref 60–?)
ERYTHROCYTE [DISTWIDTH] IN BLOOD BY AUTOMATED COUNT: 11.4 %
FASTING PATIENT LIPID ANSWER: YES
FASTING STATUS PATIENT QL REPORTED: YES
GLUCOSE BLD-MCNC: 106 MG/DL (ref 70–99)
HCT VFR BLD AUTO: 49.3 %
HDLC SERPL-MCNC: 50 MG/DL (ref 40–59)
HGB BLD-MCNC: 16.8 G/DL
LDLC SERPL CALC-MCNC: 151 MG/DL (ref ?–100)
MCH RBC QN AUTO: 32.6 PG (ref 26–34)
MCHC RBC AUTO-ENTMCNC: 34.1 G/DL (ref 31–37)
MCV RBC AUTO: 95.5 FL
NONHDLC SERPL-MCNC: 202 MG/DL (ref ?–130)
OSMOLALITY SERPL CALC.SUM OF ELEC: 288 MOSM/KG (ref 275–295)
PLATELET # BLD AUTO: 262 10(3)UL (ref 150–450)
POTASSIUM SERPL-SCNC: 3.9 MMOL/L (ref 3.5–5.1)
RBC # BLD AUTO: 5.16 X10(6)UL
SODIUM SERPL-SCNC: 138 MMOL/L (ref 136–145)
TRIGL SERPL-MCNC: 280 MG/DL (ref 30–149)
TSI SER-ACNC: 0.79 MIU/ML (ref 0.36–3.74)
VLDLC SERPL CALC-MCNC: 54 MG/DL (ref 0–30)
WBC # BLD AUTO: 11.4 X10(3) UL (ref 4–11)

## 2023-12-21 PROCEDURE — 3074F SYST BP LT 130 MM HG: CPT | Performed by: FAMILY MEDICINE

## 2023-12-21 PROCEDURE — 85027 COMPLETE CBC AUTOMATED: CPT | Performed by: FAMILY MEDICINE

## 2023-12-21 PROCEDURE — 80061 LIPID PANEL: CPT | Performed by: FAMILY MEDICINE

## 2023-12-21 PROCEDURE — 84450 TRANSFERASE (AST) (SGOT): CPT | Performed by: FAMILY MEDICINE

## 2023-12-21 PROCEDURE — 84460 ALANINE AMINO (ALT) (SGPT): CPT | Performed by: FAMILY MEDICINE

## 2023-12-21 PROCEDURE — 84443 ASSAY THYROID STIM HORMONE: CPT | Performed by: FAMILY MEDICINE

## 2023-12-21 PROCEDURE — 3079F DIAST BP 80-89 MM HG: CPT | Performed by: FAMILY MEDICINE

## 2023-12-21 PROCEDURE — 80048 BASIC METABOLIC PNL TOTAL CA: CPT | Performed by: FAMILY MEDICINE

## 2023-12-21 PROCEDURE — 99396 PREV VISIT EST AGE 40-64: CPT | Performed by: FAMILY MEDICINE

## 2023-12-21 RX ORDER — TURMERIC 400 MG
1 CAPSULE ORAL DAILY
COMMUNITY
Start: 2023-12-21

## 2023-12-22 ENCOUNTER — TELEPHONE (OUTPATIENT)
Dept: FAMILY MEDICINE CLINIC | Facility: CLINIC | Age: 53
End: 2023-12-22

## 2023-12-22 DIAGNOSIS — E78.5 HYPERLIPIDEMIA, UNSPECIFIED HYPERLIPIDEMIA TYPE: Primary | ICD-10-CM

## 2023-12-22 RX ORDER — ROSUVASTATIN CALCIUM 5 MG/1
5 TABLET, COATED ORAL NIGHTLY
Qty: 90 TABLET | Refills: 0 | Status: SHIPPED | OUTPATIENT
Start: 2023-12-22

## 2023-12-22 NOTE — TELEPHONE ENCOUNTER
We can try low dose Crestor 5 mg. It is in the same family as the Lipitor. Many times people will tolerate the Crestor better then Lipitor.   Thanks

## 2023-12-22 NOTE — TELEPHONE ENCOUNTER
Future Appointments   Date Time Provider Erasmo Romero   3/9/2024  8:15 AM  [facility] Jory Arango

## 2023-12-22 NOTE — TELEPHONE ENCOUNTER
----- Message from Yosi Portillo, [address] sent at 12/22/2023 11:56 AM CST -----  Pt has been notified. He states he was given Lipitor in the past and his stomach could not tolerate. Pt is asking if there is anything else that can be prescribed? Forward to Dr. Abhijeet Godinez, please advise.

## 2023-12-22 NOTE — TELEPHONE ENCOUNTER
Please let patient or caregiver know or leave message that:   Crestor sent  Repeat fasting labs with nursing in 10 weeks  Thanks

## 2023-12-22 NOTE — TELEPHONE ENCOUNTER
Patient advised and verbalized understanding.   Patient willing to try Crestor - would like sent to [facility] in [place] [address]

## 2024-03-09 ENCOUNTER — NURSE ONLY (OUTPATIENT)
Dept: FAMILY MEDICINE CLINIC | Facility: CLINIC | Age: 54
End: 2024-03-09
Payer: COMMERCIAL

## 2024-03-09 DIAGNOSIS — E78.5 HYPERLIPIDEMIA, UNSPECIFIED HYPERLIPIDEMIA TYPE: ICD-10-CM

## 2024-03-09 LAB
ALT SERPL-CCNC: 46 U/L
AST SERPL-CCNC: 27 U/L (ref 15–37)
CHOLEST SERPL-MCNC: 146 MG/DL (ref ?–200)
FASTING PATIENT LIPID ANSWER: YES
HDLC SERPL-MCNC: 54 MG/DL (ref 40–59)
LDLC SERPL CALC-MCNC: 69 MG/DL (ref ?–100)
NONHDLC SERPL-MCNC: 92 MG/DL (ref ?–130)
TRIGL SERPL-MCNC: 129 MG/DL (ref 30–149)
VLDLC SERPL CALC-MCNC: 20 MG/DL (ref 0–30)

## 2024-03-09 PROCEDURE — 84460 ALANINE AMINO (ALT) (SGPT): CPT | Performed by: FAMILY MEDICINE

## 2024-03-09 PROCEDURE — 84450 TRANSFERASE (AST) (SGOT): CPT | Performed by: FAMILY MEDICINE

## 2024-03-09 PROCEDURE — 80061 LIPID PANEL: CPT | Performed by: FAMILY MEDICINE

## 2024-03-09 NOTE — PROGRESS NOTES
Glenn Grady present in office for nurse visit.  Labs as ordered by Dr. Early; 21 g, Right AC, and green tube     All questions/concerns addressed. Patient left in stable condition.

## 2024-03-26 RX ORDER — INDOMETHACIN 50 MG/1
50 CAPSULE ORAL
Qty: 90 CAPSULE | Refills: 0 | Status: SHIPPED | OUTPATIENT
Start: 2024-03-26

## 2024-03-26 NOTE — TELEPHONE ENCOUNTER
Non-Narcotic Pain Medication Protocol Passed03/25/2024 08:14 PM   Protocol Details In person appointment or virtual visit in the past 6 mos or appointment in next 3 mos   Refilled per protocol  indomethacin 50 MG Oral Cap   Last refilled on 2/4/22 #90 with 0 rf.  LOV- 12/21/23  Last labs- 3/9/24    Sent to pharmacy

## 2024-05-08 ENCOUNTER — OFFICE VISIT (OUTPATIENT)
Dept: FAMILY MEDICINE CLINIC | Facility: CLINIC | Age: 54
End: 2024-05-08
Payer: COMMERCIAL

## 2024-05-08 ENCOUNTER — HOSPITAL ENCOUNTER (OUTPATIENT)
Dept: GENERAL RADIOLOGY | Age: 54
Discharge: HOME | End: 2024-05-08
Attending: FAMILY MEDICINE
Payer: COMMERCIAL

## 2024-05-08 VITALS
OXYGEN SATURATION: 98 % | BODY MASS INDEX: 35 KG/M2 | WEIGHT: 235 LBS | TEMPERATURE: 97 F | HEART RATE: 102 BPM | RESPIRATION RATE: 16 BRPM | SYSTOLIC BLOOD PRESSURE: 110 MMHG | DIASTOLIC BLOOD PRESSURE: 80 MMHG

## 2024-05-08 DIAGNOSIS — M54.50 LUMBAR PAIN: ICD-10-CM

## 2024-05-08 DIAGNOSIS — M53.3 SACROILIAC JOINT PAIN: ICD-10-CM

## 2024-05-08 DIAGNOSIS — M53.3 SACROILIAC JOINT PAIN: Primary | ICD-10-CM

## 2024-05-08 LAB
ERYTHROCYTE [DISTWIDTH] IN BLOOD BY AUTOMATED COUNT: 11.3 %
ERYTHROCYTE [SEDIMENTATION RATE] IN BLOOD: 30 MM/HR
HCT VFR BLD AUTO: 46.7 %
HGB BLD-MCNC: 16.2 G/DL
MCH RBC QN AUTO: 32.6 PG (ref 26–34)
MCHC RBC AUTO-ENTMCNC: 34.7 G/DL (ref 31–37)
MCV RBC AUTO: 94 FL
PLATELET # BLD AUTO: 242 10(3)UL (ref 150–450)
RBC # BLD AUTO: 4.97 X10(6)UL
WBC # BLD AUTO: 8.2 X10(3) UL (ref 4–11)

## 2024-05-08 PROCEDURE — 85027 COMPLETE CBC AUTOMATED: CPT | Performed by: FAMILY MEDICINE

## 2024-05-08 PROCEDURE — 85652 RBC SED RATE AUTOMATED: CPT | Performed by: FAMILY MEDICINE

## 2024-05-08 PROCEDURE — 3079F DIAST BP 80-89 MM HG: CPT | Performed by: FAMILY MEDICINE

## 2024-05-08 PROCEDURE — 99214 OFFICE O/P EST MOD 30 MIN: CPT | Performed by: FAMILY MEDICINE

## 2024-05-08 PROCEDURE — 72100 X-RAY EXAM L-S SPINE 2/3 VWS: CPT | Performed by: FAMILY MEDICINE

## 2024-05-08 PROCEDURE — 72202 X-RAY EXAM SI JOINTS 3/> VWS: CPT | Performed by: FAMILY MEDICINE

## 2024-05-08 PROCEDURE — 3074F SYST BP LT 130 MM HG: CPT | Performed by: FAMILY MEDICINE

## 2024-05-08 RX ORDER — PREDNISONE 20 MG/1
TABLET ORAL
Qty: 18 TABLET | Refills: 0 | Status: SHIPPED | OUTPATIENT
Start: 2024-05-08

## 2024-05-08 NOTE — PROGRESS NOTES
Glenn Grady is a 53 year old male.    CC:    Chief Complaint   Patient presents with    Low Back Pain     Rt low back       HPI:  Chief Complaint: R Low Back Pain  Duration:  4 Week(s) or so  Severity: severe  Cause/ Description of Injury: acute onset while bending over to pick something up  Radiation of pain: R groin and R teste  Aggravated by: bending, twisting  Alleviated by: nothing provides relief, OTC meds and one Chiro session have been tried.   Weakness:  No   Bowel/Bladder Dysfunction:  No   He has been off of Crestor since 3/2024  Previous Imaging:   PROCEDURE:  XR LUMBAR SPINE (MIN 2 VIEWS) (CPT=72100)     LOCATION:  [facility]       TECHNIQUE:  AP, lateral, and coned down L5-S1 views were obtained.     COMPARISON:  None.     INDICATIONS:  M53.3 Sacroiliac pain M54.50 Lumbar pain     PATIENT STATED HISTORY: (As transcribed by Technologist)  The patient has pain to the posterior pelvis that radiates anteriorly but did not have an injury.     FINDINGS:      BONES:  There is mild facet joint degenerative changes at L4-5 and L5-S1.  There is no acute fracture dislocation.  There is no lytic or blastic lesion.  There is minimal dextroscoliosis of the lumbar spine.  DISC SPACES:  There is mild degenerative changes at the disc spaces with disc space narrowing osteophyte formation at L2-3 more on the right than the left, L3-4 bilaterally and L4-5 bilaterally.  PARASPINOUS:  Negative.  No paraspinous abnormality is seen.  OTHER:  Negative.       Impression   CONCLUSION:  Mild multilevel degenerative changes of the lumbar spine as described above.        Dictated by (CST): West Pearson MD on 3/01/2023 at 4:56 PM        PROCEDURE:  XR SACROILIAC JOINTS (MIN 3 VIEWS) (CPT=72202)     LOCATION:  [facility]     INDICATIONS:  M53.3 Sacroiliac pain M54.50 Lumbar pain     COMPARISON:  None.     TECHNIQUE:  Frontal and oblique of the sacroiliac joints were obtained.     PATIENT STATED HISTORY: (As transcribed by  Technologist)  The patient has pain to the posterior pelvis that radiates anteriorly but did not have an injury.         FINDINGS:  There is no evidence of erosions or significant arthritic change involving the sacroiliac joints.  There is no evidence of fusion across the joint space.  No lytic or blastic lesions.        Impression   CONCLUSION:  Unremarkable x-ray of the sacroiliac joints.        Dictated by (CST): West Pearson MD on 3/01/2023 at 4:52 PM         Allergies:  No Known Allergies   Current Meds:  Current Outpatient Medications   Medication Sig Dispense Refill    indomethacin 50 MG Oral Cap Take 1 capsule (50 mg total) by mouth 3 (three) times daily with meals. 90 capsule 0    rosuvastatin (CRESTOR) 5 MG Oral Tab Take 1 tablet (5 mg total) by mouth nightly. 90 tablet 0    Turmeric 400 MG Oral Cap Take 1 capsule by mouth daily.      Vitamin D, Cholecalciferol, 10 MCG (400 UNIT) Oral Tab Take 400 Units by mouth daily. Patient unsure of dose      cetirizine 10 MG Oral Tab Take 10 mg by mouth daily.          History:  Past Medical History:    Allergic rhinitis    Hyperlipidemia    Neuropathy    Thoracic aorta atherosclerosis (HCC)    Incindental finding on CT heart score      Past Surgical History:   Procedure Laterality Date    Colonoscopy,diagnostic N/A 6/26/2015    Procedure: COLONOSCOPY, POSSIBLE BIOPSY, POSSIBLE POLYPECTOMY 09496;  Surgeon: Charles Bowman MD;  Location: [facility], [facility]    Hemorrhoidectomy      Ligation    Other surgical history      neck cystectomy    Other surgical history      tendon repair in hand '94    Other surgical history      foot surgery '92      No family history on file.   Family Status   Relation Status    Mo Alive    Fa Alive      Social History     Socioeconomic History    Marital status:    Tobacco Use    Smoking status: Former     Current packs/day: 0.00     Average packs/day: 0.5 packs/day for 20.0 years (10.0 ttl pk-yrs)     Types: Cigarettes      Start date: 1980     Quit date: 2000     Years since quittin.3    Smokeless tobacco: Never   Vaping Use    Vaping status: Never Used   Substance and Sexual Activity    Alcohol use: Yes     Comment: occationally    Drug use: No    Sexual activity: Yes     Partners: Male        ROS:  General: no fevers  Skin/Breast: Denies: rash    Vitals: /80 (BP Location: Right arm, Patient Position: Sitting, Cuff Size: adult)   Pulse 102   Temp 97.2 °F (36.2 °C)   Resp 16   Wt 235 lb (106.6 kg)   SpO2 98%   BMI 35.21 kg/m²    Reviewed by ALYSHA Coyle M.D.    Physical Exam:  GEN: well developed, well nourished, grimacing with movement, limp to ambulation  EYE: B conjunctiva and lids normal  HENT: No oral lesions.   NECK: No lymphadenopathy, thyromegaly or masses  CAR: S1, S2 normal, RRR; no S3, no S4; no click; murmur negative  PULM: clear to auscultation B, no accessory muscle use  GI: not examined  PSYCH: alert and oriented x 3; affect appropriate  SKIN: no rashes, no suspicious lesions on lower back and SI regions  EXTREMITIES: No clubbing, cyanosis or edema  GENITAL: not examined  LYMPH: no supraclavicular nodes  MS: + lower lumbar and R SI region tenderness  NEURO: Awake and alert. Normal speech and articulation. No facial droop or asymmetry. Moving all extremities equally    ASSESSMENT AND PLAN    1. Sacroiliac joint pain  Await results   Prednisone taper    - XR LUMBAR SPINE (MIN 2 VIEWS) (CPT=72100); Future  - XR SACROILIAC JOINTS (MIN 3 VIEWS) (CPT=72202); Future  - CBC, Platelet; No Differential; Future  - Sed Rate, Westergren (Automated); Future      2. Lumbar pain  As above   - XR LUMBAR SPINE (MIN 2 VIEWS) (CPT=72100); Future  - XR SACROILIAC JOINTS (MIN 3 VIEWS) (CPT=72202); Future    - CBC, Platelet; No Differential  - Sed Rate, Westergren (Automated)      No follow-ups on file.    Orders for this visit:    Orders Placed This Encounter   Procedures    CBC, Platelet; No Differential     Standing  Status:   Future     Number of Occurrences:   1     Standing Expiration Date:   5/8/2025    Sed ElbertKobe (Automated)     Standing Status:   Future     Number of Occurrences:   1     Standing Expiration Date:   5/8/2025    VENIPUNCTURE     Order Specific Question:   Release to patient     Answer:   Immediate       None    Meds & Refills for this Visit:  Requested Prescriptions     Signed Prescriptions Disp Refills    predniSONE 20 MG Oral Tab 18 tablet 0     Sig: 3 qd x 3 days, then 2 qd x 3 days, then 1 qd x 3 days with food.             Authorized by Dami Coyle M.D.

## 2024-08-18 ENCOUNTER — PATIENT MESSAGE (OUTPATIENT)
Dept: FAMILY MEDICINE CLINIC | Facility: CLINIC | Age: 54
End: 2024-08-18

## 2024-08-18 DIAGNOSIS — Z78.9 ENGAGES IN TRAVEL ABROAD: Primary | ICD-10-CM

## 2024-08-19 NOTE — TELEPHONE ENCOUNTER
From: Glenn Grady  To: Dami Coyle  Sent: 8/18/2024 5:00 PM CDT  Subject: Yellow Fever Vaccine    Hello Dr Coyle,    I would just like to ask if you administer Yellow Fever Vaccine in your office? If so, may i get the yellow fever vaccine? Please advise.     Thank you very much,  Glenn

## 2024-09-02 ENCOUNTER — PATIENT MESSAGE (OUTPATIENT)
Dept: FAMILY MEDICINE CLINIC | Facility: CLINIC | Age: 54
End: 2024-09-02

## 2024-09-03 NOTE — TELEPHONE ENCOUNTER
From: Glenn Grady  To: Dami Coyle  Sent: 9/2/2024 5:28 PM CDT  Subject: Yello Fever Vaccine    Hello Dr Coyle,    I called [facility], and they said that they do administer Yellow Fever Vaccine, but they will need a Prescription.    Will you be able to write a prescription for my Yellow Fever Vaccine?    Please advise.    Thank you so much,  Glenn

## 2024-09-23 ENCOUNTER — PATIENT MESSAGE (OUTPATIENT)
Dept: FAMILY MEDICINE CLINIC | Facility: CLINIC | Age: 54
End: 2024-09-23

## 2024-09-24 NOTE — TELEPHONE ENCOUNTER
From: Glenn Grady  To: Dami Coyle  Sent: 9/23/2024 8:58 PM CDT  Subject: Vaccines    Hello Dr Coyle,  I would just like to ask when can i get Td vaccine, Prevnar 20 and vaccine for the Shingles?  Please advise.  Thank you very much,  Glenn

## 2024-09-25 ENCOUNTER — TELEPHONE (OUTPATIENT)
Dept: FAMILY MEDICINE CLINIC | Facility: CLINIC | Age: 54
End: 2024-09-25

## 2024-09-25 ENCOUNTER — NURSE ONLY (OUTPATIENT)
Dept: FAMILY MEDICINE CLINIC | Facility: CLINIC | Age: 54
End: 2024-09-25
Payer: COMMERCIAL

## 2024-09-25 PROCEDURE — 90750 HZV VACC RECOMBINANT IM: CPT | Performed by: FAMILY MEDICINE

## 2024-09-25 PROCEDURE — 90472 IMMUNIZATION ADMIN EACH ADD: CPT | Performed by: FAMILY MEDICINE

## 2024-09-25 PROCEDURE — 90471 IMMUNIZATION ADMIN: CPT | Performed by: FAMILY MEDICINE

## 2024-09-25 PROCEDURE — 90677 PCV20 VACCINE IM: CPT | Performed by: FAMILY MEDICINE

## 2024-09-25 PROCEDURE — 90714 TD VACC NO PRESV 7 YRS+ IM: CPT | Performed by: FAMILY MEDICINE

## 2024-09-25 NOTE — PROGRESS NOTES
Patient to clinic for vaccination per Dr Coyle  Medications, dose and expiration verified by Mary Anne Skelton   Administered Shingles vaccine IM left deltoid by Mary Anne Skelton MA    Administered Prevnar 20 and TD vaccine IM in the right deltoid by Rossy Flores RN. Prevnar 20 on the upper right and TD below on right deltoid.     Patient left office in stable condition

## 2024-09-25 NOTE — TELEPHONE ENCOUNTER
Patient is coming in for vaccines today.      Please place order.     Note from 9/23:  Patient is asking if his pneumonia, Td, and shingles can all be done at once.

## 2024-09-25 NOTE — TELEPHONE ENCOUNTER
Patient is already scheduled for 3 pm this afternoon, informed patient that he will be okay to address all three vaccinations at his nurse visit this afternoon.

## 2024-10-27 ENCOUNTER — IMMUNIZATION (OUTPATIENT)
Dept: FAMILY MEDICINE CLINIC | Facility: CLINIC | Age: 54
End: 2024-10-27
Payer: COMMERCIAL

## 2024-10-27 PROCEDURE — 90656 IIV3 VACC NO PRSV 0.5 ML IM: CPT | Performed by: NURSE PRACTITIONER

## 2024-10-27 PROCEDURE — 90471 IMMUNIZATION ADMIN: CPT | Performed by: NURSE PRACTITIONER

## 2024-12-06 ENCOUNTER — OFFICE VISIT (OUTPATIENT)
Dept: FAMILY MEDICINE CLINIC | Facility: CLINIC | Age: 54
End: 2024-12-06
Payer: COMMERCIAL

## 2024-12-06 VITALS
HEART RATE: 104 BPM | DIASTOLIC BLOOD PRESSURE: 72 MMHG | WEIGHT: 244.19 LBS | BODY MASS INDEX: 36.17 KG/M2 | OXYGEN SATURATION: 99 % | TEMPERATURE: 97 F | HEIGHT: 69 IN | SYSTOLIC BLOOD PRESSURE: 122 MMHG

## 2024-12-06 DIAGNOSIS — I70.0 THORACIC AORTA ATHEROSCLEROSIS (HCC): ICD-10-CM

## 2024-12-06 DIAGNOSIS — Z00.00 WELL ADULT EXAM: Primary | ICD-10-CM

## 2024-12-06 LAB
ALT SERPL-CCNC: 38 U/L
ANION GAP SERPL CALC-SCNC: 8 MMOL/L (ref 0–18)
AST SERPL-CCNC: 25 U/L (ref ?–34)
BUN BLD-MCNC: 18 MG/DL (ref 9–23)
CALCIUM BLD-MCNC: 10.1 MG/DL (ref 8.7–10.4)
CHLORIDE SERPL-SCNC: 105 MMOL/L (ref 98–112)
CHOLEST SERPL-MCNC: 287 MG/DL (ref ?–200)
CO2 SERPL-SCNC: 24 MMOL/L (ref 21–32)
CREAT BLD-MCNC: 1.01 MG/DL
EGFRCR SERPLBLD CKD-EPI 2021: 88 ML/MIN/1.73M2 (ref 60–?)
ERYTHROCYTE [DISTWIDTH] IN BLOOD BY AUTOMATED COUNT: 11.2 %
FASTING PATIENT LIPID ANSWER: YES
FASTING STATUS PATIENT QL REPORTED: YES
GLUCOSE BLD-MCNC: 106 MG/DL (ref 70–99)
HCT VFR BLD AUTO: 49.1 %
HDLC SERPL-MCNC: 54 MG/DL (ref 40–59)
HGB BLD-MCNC: 16.9 G/DL
LDLC SERPL CALC-MCNC: 171 MG/DL (ref ?–100)
MCH RBC QN AUTO: 33.2 PG (ref 26–34)
MCHC RBC AUTO-ENTMCNC: 34.4 G/DL (ref 31–37)
MCV RBC AUTO: 96.5 FL
NONHDLC SERPL-MCNC: 233 MG/DL (ref ?–130)
OSMOLALITY SERPL CALC.SUM OF ELEC: 286 MOSM/KG (ref 275–295)
PLATELET # BLD AUTO: 265 10(3)UL (ref 150–450)
POTASSIUM SERPL-SCNC: 4.1 MMOL/L (ref 3.5–5.1)
PSA SERPL-MCNC: 1.13 NG/ML (ref ?–4)
RBC # BLD AUTO: 5.09 X10(6)UL
SODIUM SERPL-SCNC: 137 MMOL/L (ref 136–145)
TRIGL SERPL-MCNC: 326 MG/DL (ref 30–149)
TSI SER-ACNC: 0.9 UIU/ML (ref 0.55–4.78)
VLDLC SERPL CALC-MCNC: 67 MG/DL (ref 0–30)
WBC # BLD AUTO: 7.4 X10(3) UL (ref 4–11)

## 2024-12-06 PROCEDURE — 84153 ASSAY OF PSA TOTAL: CPT | Performed by: FAMILY MEDICINE

## 2024-12-06 PROCEDURE — 84460 ALANINE AMINO (ALT) (SGPT): CPT | Performed by: FAMILY MEDICINE

## 2024-12-06 PROCEDURE — 84443 ASSAY THYROID STIM HORMONE: CPT | Performed by: FAMILY MEDICINE

## 2024-12-06 PROCEDURE — 80061 LIPID PANEL: CPT | Performed by: FAMILY MEDICINE

## 2024-12-06 PROCEDURE — 84450 TRANSFERASE (AST) (SGOT): CPT | Performed by: FAMILY MEDICINE

## 2024-12-06 PROCEDURE — 85027 COMPLETE CBC AUTOMATED: CPT | Performed by: FAMILY MEDICINE

## 2024-12-06 PROCEDURE — 84681 ASSAY OF C-PEPTIDE: CPT | Performed by: FAMILY MEDICINE

## 2024-12-06 PROCEDURE — 80048 BASIC METABOLIC PNL TOTAL CA: CPT | Performed by: FAMILY MEDICINE

## 2024-12-06 NOTE — PROGRESS NOTES
Glenn Grady is a 54 year old male.    CC:    Chief Complaint   Patient presents with    Physical     Reviewed Preventative/Wellness form with patient.         HPI:  Yearly PX    Last PSA:   Recent Results (from the past 663813 hours)   PSA Total, Screen    Collection Time: 12/21/23 11:03 AM   Result Value Ref Range    Prostate Specific Antigen Screen 1.30 <=4.00 ng/mL     *Note: Due to a large number of results and/or encounters for the requested time period, some results have not been displayed. A complete set of results can be found in Results Review.        Last lipid:  Lab Results   Component Value Date    CHOLEST 146 03/09/2024    TRIG 129 03/09/2024    HDL 54 03/09/2024    LDL 69 03/09/2024    VLDL 20 03/09/2024    TCHDLRATIO 3.58 08/05/2017    NONHDLC 92 03/09/2024   Crestor stopped in 3/2024 when he developed some SI region pain.   CT heart score 0 in 2021 at  affiliated facility    Last Colon Cancer screening: Colonoscopy  09/22/2023 to be repeated 2033      Immunization History   Administered Date(s) Administered    Covid-19 Vaccine Moderna 100 mcg/0.5 ml 03/21/2021, 04/18/2021, 11/04/2021    Covid-19 Vaccine Pfizer Bivalent 30mcg/0.3mL 12/10/2022    FLULAVAL 6 months & older 0.5 ml Prefilled syringe (89404) 10/06/2017, 10/09/2018, 10/23/2019, 10/09/2020, 10/12/2021, 10/26/2022    FLUZONE 6 months and older PFS 0.5 ml (66236) 09/28/2016, 10/12/2021, 12/01/2023    Fluvirin, 3 Years & >, Im 09/17/2011, 08/29/2012    Influenza Vaccine, trivalent (IIV3), PF 0.5mL (05254) 10/27/2024    Pneumococcal Conjugate PCV20 09/25/2024    TDAP 05/28/2014    Td, Preserv Free 09/25/2024    Zoster Vaccine Recombinant Adjuvanted (Shingrix) 09/25/2024       Patient Active Problem List   Diagnosis    Thoracic aorta atherosclerosis (HCC)      Allergies:  Allergies[1]   Current Meds:  Current Outpatient Medications   Medication Sig Dispense Refill    indomethacin 50 MG Oral Cap Take 1 capsule (50 mg total) by  mouth 3 (three) times daily with meals. 90 capsule 0    Turmeric 400 MG Oral Cap Take 1 capsule by mouth daily.      cetirizine 10 MG Oral Tab Take 10 mg by mouth daily.          History:  Past Medical History:    Allergic rhinitis    Hyperlipidemia    Neuropathy    Thoracic aorta atherosclerosis (HCC)    Incindental finding on CT heart score      Past Surgical History:   Procedure Laterality Date    Colonoscopy,diagnostic N/A 2015    Procedure: COLONOSCOPY, POSSIBLE BIOPSY, POSSIBLE POLYPECTOMY 30017;  Surgeon: Charles Bowman MD;  [facility]    Hemorrhoidectomy      Ligation    Other surgical history      neck cystectomy    Other surgical history      tendon repair in hand     Other surgical history      foot surgery       No family history on file.   Family Status   Relation Status    Mo Alive    Fa Alive      Social History     Socioeconomic History    Marital status:    Tobacco Use    Smoking status: Former     Current packs/day: 0.00     Average packs/day: 0.5 packs/day for 20.0 years (10.0 ttl pk-yrs)     Types: Cigarettes     Start date: 1980     Quit date: 2000     Years since quittin.9    Smokeless tobacco: Never   Vaping Use    Vaping status: Never Used   Substance and Sexual Activity    Alcohol use: Yes     Comment: occationally    Drug use: No    Sexual activity: Yes     Partners: Male        ROS:  General: difficulty losing weight, admits that his diet could be better  MS: notes joint pains, especially knees when he eats higher carb content foods    Vitals: /72   Pulse 104   Temp 97 °F (36.1 °C) (Temporal)   Ht 5' 9\" (1.753 m)   Wt 244 lb 3.2 oz (110.8 kg)   SpO2 99%   BMI 36.06 kg/m²    Reviewed by ALYSHA Coyle M.D.    Physical Exam:  GEN: well developed, well nourished, in no apparent distress  EYE: B conjunctiva and lids normal  HENT: normocephalic; normal nose, pharynx and TM's  NECK: No lymphadenopathy, thyromegaly or masses  CAR:  S1, S2 normal, RRR; no S3, no S4; no click; murmur negative  PULM: clear to auscultation B, no accessory muscle use  GI: normal active BS+, soft, nondistended; no HSM; no masses; no bruits; no masses; nontender, no G/R/R   PSYCH: alert and oriented x 3; affect appropriate  SKIN: not examined  EXTREMITIES: No clubbing, cyanosis or edema  GENITAL: not examined  LYMPH: no supraclavicular nodes  NEURO: Awake and alert. Normal speech and articulation. No facial droop or asymmetry. Moving all extremities equally. Symmetric B patellar DTRs    ASSESSMENT AND PLAN    1. Well adult exam  Colonoscopy in 2033  Await results   Recommended wt loss via calorie reduction and exercise    - ALT(SGPT); Future  - AST (SGOT); Future  - PSA Total, Diagnostic; Future  - TSH W Reflex To Free T4; Future  - Lipid Panel; Future  - CBC, Platelet; No Differential; Future  - Basic Metabolic Panel (8); Future  - C-Peptide [E]; Future    2. Thoracic aorta atherosclerosis (HCC)  Await results, if elevated I will recommend restarting Crestor and observation for joint pains  - Lipid Panel; Future    3. BMI > 30  If c peptide elevated then consider Metformin use to help with likely insulin resistance.       No follow-ups on file.    Orders for this visit:    Orders Placed This Encounter   Procedures    ALT(SGPT)     Standing Status:   Future     Standing Expiration Date:   12/6/2025    AST (SGOT)     Standing Status:   Future     Standing Expiration Date:   12/6/2025    PSA Total, Diagnostic     Standing Status:   Future     Standing Expiration Date:   12/6/2025    TSH W Reflex To Free T4     Standing Status:   Future     Standing Expiration Date:   12/6/2025    Lipid Panel     Standing Status:   Future     Standing Expiration Date:   12/6/2025    CBC, Platelet; No Differential     Standing Status:   Future     Standing Expiration Date:   12/6/2025    Basic Metabolic Panel (8)     Standing Status:   Future     Standing Expiration Date:   12/6/2025     C-Peptide [E]     Standing Status:   Future     Standing Expiration Date:   12/6/2025     Order Specific Question:   Release to patient     Answer:   Immediate       None    Meds & Refills for this Visit:  Requested Prescriptions      No prescriptions requested or ordered in this encounter             Authorized by Dami Coyle M.D.               [1] No Known Allergies

## 2024-12-08 ENCOUNTER — PATIENT MESSAGE (OUTPATIENT)
Dept: FAMILY MEDICINE CLINIC | Facility: CLINIC | Age: 54
End: 2024-12-08

## 2024-12-08 DIAGNOSIS — E78.5 HYPERLIPIDEMIA, UNSPECIFIED HYPERLIPIDEMIA TYPE: Primary | ICD-10-CM

## 2024-12-08 DIAGNOSIS — I70.0 THORACIC AORTA ATHEROSCLEROSIS (HCC): ICD-10-CM

## 2024-12-08 LAB — C-PEPTIDE: 4.2 NG/ML

## 2024-12-09 RX ORDER — LEVOCETIRIZINE DIHYDROCHLORIDE 5 MG/1
5 TABLET, FILM COATED ORAL EVERY EVENING
COMMUNITY
Start: 2024-12-09

## 2024-12-09 RX ORDER — ROSUVASTATIN CALCIUM 5 MG/1
5 TABLET, COATED ORAL NIGHTLY
Qty: 90 TABLET | Refills: 0 | Status: SHIPPED | OUTPATIENT
Start: 2024-12-09

## 2024-12-09 RX ORDER — AZELASTINE HYDROCHLORIDE, FLUTICASONE PROPIONATE 137; 50 UG/1; UG/1
SPRAY, METERED NASAL
COMMUNITY

## 2024-12-09 RX ORDER — GLUCOSAMINE/MSM/CHONDROIT SULF 500-166.6
TABLET ORAL
COMMUNITY

## 2024-12-11 ENCOUNTER — OFFICE VISIT (OUTPATIENT)
Dept: RHEUMATOLOGY | Facility: CLINIC | Age: 54
End: 2024-12-11
Payer: COMMERCIAL

## 2024-12-11 ENCOUNTER — TELEPHONE (OUTPATIENT)
Dept: FAMILY MEDICINE CLINIC | Facility: CLINIC | Age: 54
End: 2024-12-11

## 2024-12-11 VITALS
TEMPERATURE: 97 F | HEIGHT: 69 IN | BODY MASS INDEX: 35.84 KG/M2 | HEART RATE: 104 BPM | OXYGEN SATURATION: 94 % | WEIGHT: 242 LBS | DIASTOLIC BLOOD PRESSURE: 74 MMHG | RESPIRATION RATE: 18 BRPM | SYSTOLIC BLOOD PRESSURE: 118 MMHG

## 2024-12-11 DIAGNOSIS — M54.16 RIGHT LUMBAR RADICULOPATHY: ICD-10-CM

## 2024-12-11 DIAGNOSIS — G89.29 CHRONIC PAIN OF LEFT KNEE: ICD-10-CM

## 2024-12-11 DIAGNOSIS — M25.562 CHRONIC PAIN OF LEFT KNEE: ICD-10-CM

## 2024-12-11 DIAGNOSIS — M17.12 PRIMARY OSTEOARTHRITIS OF LEFT KNEE: ICD-10-CM

## 2024-12-11 DIAGNOSIS — M25.571 PAIN IN JOINTS OF BOTH FEET: ICD-10-CM

## 2024-12-11 DIAGNOSIS — M25.572 PAIN IN JOINTS OF BOTH FEET: ICD-10-CM

## 2024-12-11 DIAGNOSIS — M1A.0491 IDIOPATHIC CHRONIC GOUT OF HAND WITH TOPHUS, UNSPECIFIED LATERALITY: Primary | ICD-10-CM

## 2024-12-11 DIAGNOSIS — M25.531 RIGHT WRIST PAIN: ICD-10-CM

## 2024-12-11 DIAGNOSIS — S32.010D COMPRESSION FRACTURE OF L1 VERTEBRA WITH ROUTINE HEALING, SUBSEQUENT ENCOUNTER: ICD-10-CM

## 2024-12-11 PROCEDURE — 3074F SYST BP LT 130 MM HG: CPT | Performed by: INTERNAL MEDICINE

## 2024-12-11 PROCEDURE — G2211 COMPLEX E/M VISIT ADD ON: HCPCS | Performed by: INTERNAL MEDICINE

## 2024-12-11 PROCEDURE — 3078F DIAST BP <80 MM HG: CPT | Performed by: INTERNAL MEDICINE

## 2024-12-11 PROCEDURE — 99204 OFFICE O/P NEW MOD 45 MIN: CPT | Performed by: INTERNAL MEDICINE

## 2024-12-11 PROCEDURE — 3008F BODY MASS INDEX DOCD: CPT | Performed by: INTERNAL MEDICINE

## 2024-12-11 NOTE — PATIENT INSTRUCTIONS
Schedule lumbar spine MRI    Get X-rays of the right wrist, bilateral foot    Get blood work in February 2025    Take indomethacin twice daily with food for 1 week for the right wrist and left knee.

## 2024-12-11 NOTE — PROGRESS NOTES
Rheumatology New Patient Note  =====================================================================================================    Date of visit: 12/11/2024  ?  Chief complaint: gout, polyarthralgia  Chief Complaint   Patient presents with    New Patient     Pt is new to the office. Referred by PCP Dr. Coyle  Rapid 3 score: 3.7  Pt with no known family history of autoimmune disorders. Pt reports pain to lower back, neck, and both knees that is worse with activity. Pt sometimes with pain to wrists but not fingers/knuckles. Pt reports occasional gout flares in the feet when he eats the wrong foods.      ?  Referring (will send letter)  PCP  Dami Coyle MD  Fax: [phone]  Phone: [phone]      =====================================================================================================  HPI    Glenn Grady is a 54 year old male     Here for evaluation of polyarthralgia and gout.  Gout since at least 2016:  -gout flares once a year  -triggered by food  -mostly in the MTPs and has podagra  -takes indomethacin prn for 1-2 days.  -Has been gaining weight, likes to eat  -Fhx: brother with gout.     Left knee bothersome.  Hard to bend the knee while climbing a ladder  -Slight wrist pain over the dorsum of the right wrist for the last 2 weeks or so.  Denies any trauma  -Possible L1 wedge deformity noted on lumbar x-rays previously.  Denies any falls  -Some paresthesias over the right toes from time to time for the last 2 months    Works in supervisor for construction.  Very active    Wt Readings from Last 6 Encounters:   12/11/24 242 lb (109.8 kg)   12/06/24 244 lb 3.2 oz (110.8 kg)   05/08/24 235 lb (106.6 kg)   12/21/23 245 lb (111.1 kg)   06/01/23 237 lb (107.5 kg)   03/01/23 239 lb (108.4 kg)         14 point ROS negative except noted above    Medications:  Medications Ordered Prior to Encounter[1]    Past Medical History:  Past Medical History:    Allergic rhinitis    Hyperlipidemia     Neuropathy    Thoracic aorta atherosclerosis (HCC)    Incindental finding on CT heart score     Past Surgical History:  Past Surgical History:   Procedure Laterality Date    Colonoscopy,diagnostic N/A 2015    Procedure: COLONOSCOPY, POSSIBLE BIOPSY, POSSIBLE POLYPECTOMY 82388;  Surgeon: Charles Bowman MD;  Location: [facility], [facility]    Hemorrhoidectomy      Ligation    Other surgical history      neck cystectomy    Other surgical history      tendon repair in hand     Other surgical history      foot surgery      Family History:  Family History   Problem Relation Age of Onset    Soft Tissue Cancer Brother      Social History:  Social History     Socioeconomic History    Marital status:    Tobacco Use    Smoking status: Former     Current packs/day: 0.00     Average packs/day: 0.5 packs/day for 20.0 years (10.0 ttl pk-yrs)     Types: Cigarettes     Start date: 1980     Quit date: 2000     Years since quittin.9    Smokeless tobacco: Never   Vaping Use    Vaping status: Never Used   Substance and Sexual Activity    Alcohol use: Yes     Comment: occationally    Drug use: No    Sexual activity: Yes     Partners: Male     ?  Allergies:  Allergies[2]      Objective    Vitals:    24 0848   BP: 118/74   Pulse: 104   Resp: 18   Temp: 96.9 °F (36.1 °C)   SpO2: 94%   Weight: 242 lb (109.8 kg)   Height: 5' 9\" (1.753 m)       GEN: NAD, well-nourished.   HEENT: Head: NCAT. Face: No lesions. Eyes: Conjunctiva clear. Sclera are anicteric. PERRLA. EOMs are full. Ears: The right and left ear canals are clear.  Nose: No external or internal nasal deformities. Nasal septum is midline. Mouth: The lips are within normal limits.  No oral ulcers Tongue is midline with no lesions. The oral cavity is clear.   Neck: Supple. No neck masses. No thyromegaly.   PULM:  easy effort  Extremities: No cyanosis, edema or deformities.   Neurologic: Strength, CN2-12 grossly intact   Psych: normal affect.    Skin: No lesions or rashes.  MSK: 28 joint count performed. No evidence of synovitis in mcp, pip, dip, wrist, elbows, shoulders, hips, knees, ankles, mtp unless otherwise noted. Full ROM of elbows, wrists, knees.     Fullness of the bilateral MCPs without any tenderness  -Tender to palpation overlying the right extensor tendons of the hand at the wrist.  No wrist effusion  -Bilateral MTP 1 hypertrophy and fullness with mild hallux valgus, small effusions noted  -Mild effusion of the left greater than right knee with joint space narrowing and fullness  -Left Baker's cyst noted  -Straight leg raise positive on the right    ?  Labs:  Lab Results   Component Value Date    URIC 8.1 08/06/2018    URIC 7.8 04/07/2016       Lab Results   Component Value Date    WBC 7.4 12/06/2024    RBC 5.09 12/06/2024    HGB 16.9 12/06/2024    HCT 49.1 12/06/2024    .0 12/06/2024    MCV 96.5 12/06/2024    MCH 33.2 12/06/2024    MCHC 34.4 12/06/2024    RDW 11.2 12/06/2024    NEPRELIM 11.44 (H) 11/04/2020    NEPERCENT 77.7 11/04/2020    LYPERCENT 13.7 11/04/2020    MOPERCENT 5.7 11/04/2020    EOPERCENT 0.0 11/04/2020    BAPERCENT 0.3 11/04/2020    NE 11.44 (H) 11/04/2020    LYMABS 2.02 11/04/2020    MOABSO 0.84 11/04/2020    EOABSO 0.00 11/04/2020    BAABSO 0.04 11/04/2020     Lab Results   Component Value Date     (H) 12/06/2024    BUN 18 12/06/2024    BUNCREA 22.1 (H) 11/04/2020    CREATSERUM 1.01 12/06/2024    ANIONGAP 8 12/06/2024    GFR 76 02/01/2017    GFRNAA 76 10/12/2021    GFRAA 87 10/12/2021    CA 10.1 12/06/2024    OSMOCALC 286 12/06/2024    ALKPHO 71 11/04/2020    AST 25 12/06/2024    ALT 38 12/06/2024    BILT 0.5 11/04/2020    TP 7.3 11/04/2020    ALB 2.6 (L) 11/04/2020    GLOBULIN 4.7 (H) 11/04/2020    AGRATIO 1.5 04/30/2014     12/06/2024    K 4.1 12/06/2024     12/06/2024    CO2 24.0 12/06/2024         No results found for: \"ANATI\", \"SOHAM\", \"ANAS\", \"ANASCRN\", \"ANASCRNRFLX\", \"NILA\"  No results found  for: \"SSA\", \"SSAUR\", \"ANTISSA\", \"SSA52\", \"SSA60\", \"SSADD\", \"SSB\", \"ANTISSB\"  No results found for: \"DSDNA\", \"ANTIDSDNA\", \"SMUD\", \"ANTISM\", \"SM\", \"RNP\", \"ANTIRNP\", \"SMITHRNP\"  No results found for: \"SCL70\", \"SCL\", \"AMWAXEZ49\"  No results found for: \"C3\", \"C4\"  No results found for: \"DRVVT\", \"LAINT\", \"PTTLUPUS\", \"LUPUSINTERP\", \"LA\", \"U5JY7YFBWF\", \"N8RN0VMIVH\", \"S7EALFRYHP\", \"S8LBCMURCU\"  No results found for: \"CARDIOLIPIGG\", \"CARDIOLIPIGM\", \"CARDIOLIPIGA\", \"CARDIOIGA\", \"CARLIP\"    Additional Labs:  Radiology:    5/2024 L-spine XR  Impression   CONCLUSION:       Subtle wedge deformity of L1, stable compared to 03/01/2023.  Remaining lumbar vertebral bodies maintain normal height.  No spondylolisthesis.  Intervertebral discs are preserved with small multilevel endplate osteophytes.  Mild/moderate lower lumbar  facet arthrosis.  No appreciable pars defects.  No destructive bone lesions.        Radiology review:      =====================================================================================================  Assessment and Plan  Assessment:  1. 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 Right lumbar radiculopathy    3. Compression fracture of L1 vertebra with routine healing, subsequent encounter    4. Right wrist pain    5. Pain in joints of both feet    6. Chronic pain of left knee    7. Primary osteoarthritis of left knee      #Chronic gouty arthropathy  -Since 2016  -Flareups at least once a year, mostly leading to podagra  -Afflicting the bilateral MTPs  -Suspect some subtle gout deposits in the right hand  -Good response to as needed indomethacin  -No nephrolithiasis  -Brother with gout  -In intercritical period today    #Wedge deformity of L1 vertebrae  -Denies any osteoporosis, trauma  -Noted on 2024 lumbar spine x-ray  -No red flag symptoms    #Right wrist pain  -Consider wrist tendinitis  -Possible contribution of gout to this issue    #Left knee pain  -Likely OA, Jacome's  cyst noted    #Lumbar radiculopathy  -On the right    ?  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 IGG; Future  -     Rheumatoid Arthritis Factor; Future    Right lumbar radiculopathy  -     MRI SPINE LUMBAR (CPT=72148); Future  -     Uric Acid; Future  -     Cyclic Citrullinate Pep. IGG; Future  -     Rheumatoid Arthritis Factor; Future  -     Physical Therapy Referral - [facility]    Compression fracture of L1 vertebra with routine healing, subsequent encounter  -     MRI SPINE LUMBAR (CPT=72148); Future  -     Uric Acid; Future  -     Cyclic Citrullinate Pep. IGG; Future  -     Rheumatoid Arthritis Factor; Future    Right wrist pain  -     MRI SPINE LUMBAR (CPT=72148); Future  -     Uric Acid; Future  -     Cyclic Citrullinate Pep. IGG; Future  -     Rheumatoid Arthritis Factor; Future  -     XR FOOT, COMPLETE (MIN 3 VIEWS), RIGHT (CPT=73630); Future  -     XR FOOT, COMPLETE (MIN 3 VIEWS), LEFT (CPT=73630); Future  -     XR WRIST COMPLETE (MIN 3 VIEWS), RIGHT (CPT=73110); Future  -     Physical Therapy Referral - [facility]    Pain in joints of both feet  -     XR FOOT, COMPLETE (MIN 3 VIEWS), RIGHT (CPT=73630); Future  -     XR FOOT, COMPLETE (MIN 3 VIEWS), LEFT (CPT=73630); Future  -     XR WRIST  COMPLETE (MIN 3 VIEWS), RIGHT (CPT=73110); Future    Chronic pain of left knee  -     XR KNEE (3 VIEWS), LEFT (CPT=73562); Future  -     Physical Therapy Referral - [facility]    Primary osteoarthritis of left knee  -     Physical Therapy Referral - [facility]        Return in about 6 months (around 6/11/2025).      The above plan of care, diagnosis, orders, and follow-up were discussed with the patient. Questions related to this recommended plan of care were answered.    Thank you for referring this delightful patient to me. Please feel free to contact me with any questions.     This report was performed utilizing speech recognition software technology. Despite proofreading, speech recognition errors could escape detection. If a word or phrase is confusing or out of context, please do not hesitate to call for   clarification.       Kind regards      Wilber Ragsdale MD  [facility]         [1]   Current Outpatient Medications on File Prior to Visit   Medication Sig Dispense Refill    levocetirizine 5 MG Oral Tab Take 1 tablet (5 mg total) by mouth every evening.      Azelastine-Fluticasone 137-50 MCG/ACT Nasal Suspension       Ascorbic Acid (VITAMIN C) 125 MG Oral Chew Tab       cholecalciferol (VITAMIN D3) 125 MCG (5000 UT) Oral Cap       indomethacin 50 MG Oral Cap Take 1 capsule (50 mg total) by mouth 3 (three) times daily with meals. 90 capsule 0    Turmeric 400 MG Oral Cap Take 1 capsule by mouth daily.      rosuvastatin (CRESTOR) 5 MG Oral Tab Take 1 tablet (5 mg total) by mouth nightly. (Patient not taking: Reported on 12/11/2024) 90 tablet 0     No current facility-administered medications on file prior to visit.   [2] No Known Allergies

## 2024-12-11 NOTE — TELEPHONE ENCOUNTER
Future Appointments   Date Time Provider Department Center   12/12/2024  9:00 AM [facility] N[facility]   2/14/2025  8:00 AM [facility] [place]     Patient is coming in tomorrow for second shingles vaccine.   Last one was 09/25/2024    Please place order

## 2024-12-11 NOTE — TELEPHONE ENCOUNTER
Patient would like to know when he is able to get his second round of the shingles vaccine, round one received on 9/25/2024. Endorsed to RN.

## 2024-12-12 ENCOUNTER — NURSE ONLY (OUTPATIENT)
Dept: FAMILY MEDICINE CLINIC | Facility: CLINIC | Age: 54
End: 2024-12-12
Payer: COMMERCIAL

## 2024-12-12 PROCEDURE — 90471 IMMUNIZATION ADMIN: CPT | Performed by: FAMILY MEDICINE

## 2024-12-12 PROCEDURE — 90750 HZV VACC RECOMBINANT IM: CPT | Performed by: FAMILY MEDICINE

## 2024-12-12 NOTE — PROGRESS NOTES
Patient to clinic for 2nd Shingles vaccine per Dr Coyle    Verified dose, medication and expiration by Tangela GONG RN and Johanny BRANDT    Administered vaccine in Right Deltoid IM    Patient left office in stable condition

## 2025-01-15 ENCOUNTER — PATIENT MESSAGE (OUTPATIENT)
Dept: FAMILY MEDICINE CLINIC | Facility: CLINIC | Age: 55
End: 2025-01-15

## 2025-01-16 RX ORDER — ATOVAQUONE AND PROGUANIL HYDROCHLORIDE 250; 100 MG/1; MG/1
1 TABLET, FILM COATED ORAL DAILY
COMMUNITY
Start: 2025-01-16

## 2025-03-05 ENCOUNTER — LABORATORY ENCOUNTER (OUTPATIENT)
Dept: LAB | Age: 55
End: 2025-03-05
Attending: FAMILY MEDICINE
Payer: COMMERCIAL

## 2025-03-05 DIAGNOSIS — M1A.0491 IDIOPATHIC CHRONIC GOUT OF HAND WITH TOPHUS, UNSPECIFIED LATERALITY: ICD-10-CM

## 2025-03-05 DIAGNOSIS — I70.0 THORACIC AORTA ATHEROSCLEROSIS: ICD-10-CM

## 2025-03-05 DIAGNOSIS — M25.531 RIGHT WRIST PAIN: ICD-10-CM

## 2025-03-05 DIAGNOSIS — E78.5 HYPERLIPIDEMIA, UNSPECIFIED HYPERLIPIDEMIA TYPE: ICD-10-CM

## 2025-03-05 DIAGNOSIS — M54.16 RIGHT LUMBAR RADICULOPATHY: ICD-10-CM

## 2025-03-05 DIAGNOSIS — S32.010D COMPRESSION FRACTURE OF L1 VERTEBRA WITH ROUTINE HEALING, SUBSEQUENT ENCOUNTER: ICD-10-CM

## 2025-03-05 LAB
ALT SERPL-CCNC: 44 U/L
AST SERPL-CCNC: 31 U/L (ref ?–34)
CHOLEST SERPL-MCNC: 157 MG/DL (ref ?–200)
FASTING PATIENT LIPID ANSWER: NO
HDLC SERPL-MCNC: 58 MG/DL (ref 40–59)
LDLC SERPL CALC-MCNC: 68 MG/DL (ref ?–100)
NONHDLC SERPL-MCNC: 99 MG/DL (ref ?–130)
RHEUMATOID FACT SERPL-ACNC: 10.5 IU/ML (ref ?–14)
TRIGL SERPL-MCNC: 188 MG/DL (ref 30–149)
URATE SERPL-MCNC: 9.5 MG/DL
VLDLC SERPL CALC-MCNC: 28 MG/DL (ref 0–30)

## 2025-03-05 PROCEDURE — 84450 TRANSFERASE (AST) (SGOT): CPT | Performed by: FAMILY MEDICINE

## 2025-03-05 PROCEDURE — 84460 ALANINE AMINO (ALT) (SGPT): CPT | Performed by: FAMILY MEDICINE

## 2025-03-05 PROCEDURE — 86200 CCP ANTIBODY: CPT | Performed by: INTERNAL MEDICINE

## 2025-03-05 PROCEDURE — 86431 RHEUMATOID FACTOR QUANT: CPT | Performed by: INTERNAL MEDICINE

## 2025-03-05 PROCEDURE — 80061 LIPID PANEL: CPT | Performed by: FAMILY MEDICINE

## 2025-03-05 PROCEDURE — 84550 ASSAY OF BLOOD/URIC ACID: CPT | Performed by: INTERNAL MEDICINE

## 2025-03-06 LAB — CCP IGG SERPL-ACNC: 1.1 U/ML (ref 0–6.9)

## 2025-03-20 RX ORDER — ROSUVASTATIN CALCIUM 5 MG/1
5 TABLET, COATED ORAL NIGHTLY
Qty: 90 TABLET | Refills: 2 | Status: SHIPPED | OUTPATIENT
Start: 2025-03-20

## 2025-03-25 ENCOUNTER — OFFICE VISIT (OUTPATIENT)
Dept: RHEUMATOLOGY | Facility: CLINIC | Age: 55
End: 2025-03-25
Payer: COMMERCIAL

## 2025-03-25 VITALS
SYSTOLIC BLOOD PRESSURE: 106 MMHG | OXYGEN SATURATION: 97 % | WEIGHT: 250.81 LBS | RESPIRATION RATE: 16 BRPM | DIASTOLIC BLOOD PRESSURE: 80 MMHG | HEART RATE: 94 BPM | TEMPERATURE: 97 F | BODY MASS INDEX: 37.15 KG/M2 | HEIGHT: 69 IN

## 2025-03-25 DIAGNOSIS — M1A.0491 IDIOPATHIC CHRONIC GOUT OF HAND WITH TOPHUS, UNSPECIFIED LATERALITY: Primary | ICD-10-CM

## 2025-03-25 DIAGNOSIS — Z51.81 THERAPEUTIC DRUG MONITORING: ICD-10-CM

## 2025-03-25 DIAGNOSIS — Z13.79 GENETIC SCREENING: ICD-10-CM

## 2025-03-25 PROCEDURE — G2211 COMPLEX E/M VISIT ADD ON: HCPCS | Performed by: INTERNAL MEDICINE

## 2025-03-25 PROCEDURE — 3074F SYST BP LT 130 MM HG: CPT | Performed by: INTERNAL MEDICINE

## 2025-03-25 PROCEDURE — 99214 OFFICE O/P EST MOD 30 MIN: CPT | Performed by: INTERNAL MEDICINE

## 2025-03-25 PROCEDURE — 3008F BODY MASS INDEX DOCD: CPT | Performed by: INTERNAL MEDICINE

## 2025-03-25 PROCEDURE — 3079F DIAST BP 80-89 MM HG: CPT | Performed by: INTERNAL MEDICINE

## 2025-03-25 NOTE — H&P
The following individual(s) verbally consented to be recorded using ambient AI listening technology and understand that they can each withdraw their consent to this listening technology at any point by asking the clinician to turn off or pause the recording:    Patient name: Glenn Grady  Additional names:

## 2025-03-25 NOTE — PATIENT INSTRUCTIONS
Given unclear ancestry. Will get genetic test (HLA ). If negative then:    Allopurinol uptitration:    Take 0.5 tablets (50 mg total) by mouth daily for 30 days  THEN 1 tablet (100 mg total) daily for 30 days  THEN 2 tablets (200 mg total) daily for 30 days  THEN 3 tablets (300 mg total) daily    Repeat blood work in 4 months after      Indomethacin (only as needed for gout flares):    -three times daily with food for up to 10-14 days .  -when you take indomethacin, take prilosec at bedtime, can stop when off indomethacin

## 2025-03-25 NOTE — PROGRESS NOTES
Rheumatology f/u Patient Note  =====================================================================================================  Chief complaint: gout, polyarthralgia    Chief Complaint   Patient presents with    Follow - Up     LOV 12/11/2024  Patient feels about the same. He had a minor flare up about 1 month ago after traveling. He states he had pain in his toes on his rt foot and took 1 tablet of indomethacin and the symptoms resolved.   Rapid 3 score is 2.7     PCP  Dami Coyle MD  Fax: [phone]  Phone: [phone]  =====================================================================================================  \[facility] [place] [facility]\"" Date of visit: 12/11/2024  Glenn Grady is a 54 year old male   Here for evaluation of polyarthralgia and gout.  Gout since at least 2016:  -gout flares once a year  -triggered by food  -mostly in the MTPs and has podagra  -takes indomethacin prn for 1-2 days.  -Has been gaining weight, likes to eat  -Fhx: brother with gout.   Left knee bothersome.  Hard to bend the knee while climbing a ladder  -Slight wrist pain over the dorsum of the right wrist for the last 2 weeks or so.  Denies any trauma  -Possible L1 wedge deformity noted on lumbar x-rays previously.  Denies any falls  -Some paresthesias over the right toes from time to time for the last 2 months  Works in supervisor for construction.  Very active  ==============================================================================================================  Today's Visit: 03/25/25    He experienced a recent flare-up of gout symptoms following a long plane trip, which he attributes to prolonged sitting and possible dehydration. His symptoms typically include swelling, particularly in the hands and feet, which usually subside after some time. Dietary choices, such as consuming foods he should avoid, may contribute to these flare-ups.    He managed his gout attack with indomethacin, which he takes as needed.  During a recent episode, he experienced pain and limping in the morning, but after taking one capsule of indomethacin, he was able to walk normally by the afternoon. He rarely takes the medication three times a day as prescribed, as one capsule often suffices to alleviate his symptoms.    He has not yet scheduled x-rays due to a busy work schedule. He describes himself as a 'food adventurer' and acknowledges that his dietary habits, including frequent consumption of shrimp and sweet foods, may trigger gout flare-ups. He has attempted weight loss through various methods, including fasting and dietary changes, but finds it challenging to maintain due to his love for food.    He mentions a family history of mixed ancestry, including Swiss and Lebanese, but is uncertain about any Chinese or Kyrgyz ancestry (particularly of his father's lineage).    5 point ROS negative except noted above  I had reviewed past medical and family histories together with allergy and medication lists documented.      Medications:   rosuvastatin 5 MG Oral Tab Take 1 tablet (5 mg total) by mouth nightly. 90 tablet 2    levocetirizine 5 MG Oral Tab Take 1 tablet (5 mg total) by mouth every evening.      Azelastine-Fluticasone 137-50 MCG/ACT Nasal Suspension       Ascorbic Acid (VITAMIN C) 125 MG Oral Chew Tab       Turmeric 400 MG Oral Cap Take 1 capsule by mouth daily.       ?  Allergies:  Allergies[1]      Objective    Vitals:    03/25/25 1354   BP: 106/80   Pulse: 94   Resp: 16   Temp: 96.9 °F (36.1 °C)   SpO2: 97%   Weight: 250 lb 12.8 oz (113.8 kg)   Height: 5' 9\" (1.753 m)       GEN: NAD, well-nourished.   HEENT: Head: NCAT. Face: No lesions. Eyes: Conjunctiva clear.   PULM:  easy effort  Extremities: No cyanosis, edema or deformities.   Neurologic: Strength, CN2-12 grossly intact   Skin: No lesions or rashes.  MSK: 28 joint count performed. No evidence of synovitis in mcp, pip, dip, wrist, elbows, shoulders, hips, knees, ankles, mtp  unless otherwise noted. Full ROM of elbows, wrists, knees.     Fullness of the bilateral MCPs without any tenderness  ?  Labs:    3/2025  RF, CCP neg  Uric acid 9.5    Lab Results   Component Value Date    URIC 9.5 (H) 03/05/2025    URIC 8.1 08/06/2018    URIC 7.8 04/07/2016       Lab Results   Component Value Date    WBC 7.4 12/06/2024    RBC 5.09 12/06/2024    HGB 16.9 12/06/2024    HCT 49.1 12/06/2024    .0 12/06/2024    MCV 96.5 12/06/2024    MCH 33.2 12/06/2024    MCHC 34.4 12/06/2024    RDW 11.2 12/06/2024    NEPRELIM 11.44 (H) 11/04/2020    NEPERCENT 77.7 11/04/2020    LYPERCENT 13.7 11/04/2020    MOPERCENT 5.7 11/04/2020    EOPERCENT 0.0 11/04/2020    BAPERCENT 0.3 11/04/2020    NE 11.44 (H) 11/04/2020    LYMABS 2.02 11/04/2020    MOABSO 0.84 11/04/2020    EOABSO 0.00 11/04/2020    BAABSO 0.04 11/04/2020     Lab Results   Component Value Date     (H) 12/06/2024    BUN 18 12/06/2024    BUNCREA 22.1 (H) 11/04/2020    CREATSERUM 1.01 12/06/2024    ANIONGAP 8 12/06/2024    GFR 76 02/01/2017    GFRNAA 76 10/12/2021    GFRAA 87 10/12/2021    CA 10.1 12/06/2024    OSMOCALC 286 12/06/2024    ALKPHO 71 11/04/2020    AST 31 03/05/2025    ALT 44 03/05/2025    BILT 0.5 11/04/2020    TP 7.3 11/04/2020    ALB 2.6 (L) 11/04/2020    GLOBULIN 4.7 (H) 11/04/2020    AGRATIO 1.5 04/30/2014     12/06/2024    K 4.1 12/06/2024     12/06/2024    CO2 24.0 12/06/2024         No results found for: \"ANATI\", \"SOHAM\", \"ANAS\", \"ANASCRN\", \"ANASCRNRFLX\", \"NILA\"  No results found for: \"SSA\", \"SSAUR\", \"ANTISSA\", \"SSA52\", \"SSA60\", \"SSADD\", \"SSB\", \"ANTISSB\"  No results found for: \"DSDNA\", \"ANTIDSDNA\", \"SMUD\", \"ANTISM\", \"SM\", \"RNP\", \"ANTIRNP\", \"SMITHRNP\"  No results found for: \"SCL70\", \"SCL\", \"LEUQSHA51\"  No results found for: \"C3\", \"C4\"  No results found for: \"DRVVT\", \"LAINT\", \"PTTLUPUS\", \"LUPUSINTERP\", \"LA\", \"O6MZ8CQPDE\", \"S1HJ1DZYVA\", \"S9QQFYTNMY\", \"O6ZQISMLDF\"  No results found for: \"CARDIOLIPIGG\", \"CARDIOLIPIGM\",  \"CARDIOLIPIGA\", \"CARDIOIGA\", \"CARLIP\"    Additional Labs:  Radiology:    5/2024 L-spine XR  Impression   CONCLUSION:       Subtle wedge deformity of L1, stable compared to 03/01/2023.  Remaining lumbar vertebral bodies maintain normal height.  No spondylolisthesis.  Intervertebral discs are preserved with small multilevel endplate osteophytes.  Mild/moderate lower lumbar  facet arthrosis.  No appreciable pars defects.  No destructive bone lesions.        Radiology review:      =====================================================================================================  Assessment and Plan  Assessment:  1. 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 Genetic screening    3. Therapeutic drug monitoring      #Chronic gouty arthropathy  -Extensive discussion of pathophysiology of gout today.  Discussed gout is often caused by diminished renal handling of urate, resultant hyperuricemia, and subsequent deposition of monosodium urate crystals into articular structures.  Management of gouty arthropathy is twofold: 1) treatment of acute gout flares with NSAIDs, colchicine, corticosteroids, and/or anakinra; 2) dissolution of MSU crystals by urate lowering therapy (ULT).   -Since 2016  -Flareups at least once a year, mostly leading to podagra  -Afflicting the bilateral MTPs  -Suspect some subtle gout deposits in the right hand  -Good response to as needed indomethacin  -No nephrolithiasis  -Brother with gout  -In intercritical period today, but had a recent gout flare    #Wedge deformity of L1 vertebrae  -Denies any osteoporosis, trauma  -Noted on 2024 lumbar spine x-ray  -No red flag symptoms    #Left knee pain  -Likely OA, Jacome's cyst noted  ?  Plan:  -MRI of the lumbar spine previously ordered to confirm possible wedge deformity, is a compression fracture  -X-rays of the bilateral feet previously ordered to evaluate for erosive/gouty arthropathy  -X-ray of the right wrist previously  ordered to evaluate for any gout    Given unclear ancestry.  Cannot rule out Chinese/Amharic ancestry.  Will get genetic test (HLA ). If negative then:    Weight loss attempts not successful.  Discussed need to start urate lowering therapy given profound hyperuricemia and recurrent gout flares.  Allopurinol uptitration:    Take 0.5 tablets (50 mg total) by mouth daily for 30 days  THEN 1 tablet (100 mg total) daily for 30 days  THEN 2 tablets (200 mg total) daily for 30 days  THEN 3 tablets (300 mg total) daily    Repeat blood work in 4 months after      Indomethacin (only as needed for gout flares):    -three times daily with food for up to 10-14 days .  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      The above plan of care, diagnosis, orders, and follow-up were discussed with the patient. Questions related to this recommended plan of care were answered.    Thank you for referring this delightful patient to me. Please feel free to contact me with any questions.     This report was performed utilizing speech recognition software technology. Despite proofreading, speech recognition errors could escape detection. If a word or phrase is confusing or out of context, please do not hesitate to call for   clarification.       Kind regards      Wilber Ragsdale MD  [facility]         [1] No Known  Allergies

## 2025-04-03 ENCOUNTER — LAB ENCOUNTER (OUTPATIENT)
Dept: LAB | Age: 55
End: 2025-04-03
Attending: INTERNAL MEDICINE
Payer: COMMERCIAL

## 2025-04-03 DIAGNOSIS — Z13.79 GENETIC SCREENING: ICD-10-CM

## 2025-04-03 DIAGNOSIS — M1A.0491 IDIOPATHIC CHRONIC GOUT OF HAND WITH TOPHUS, UNSPECIFIED LATERALITY: ICD-10-CM

## 2025-04-03 PROCEDURE — 36415 COLL VENOUS BLD VENIPUNCTURE: CPT

## 2025-04-03 PROCEDURE — 81381 HLA I TYPING 1 ALLELE HR: CPT

## 2025-04-09 ENCOUNTER — TELEPHONE (OUTPATIENT)
Facility: CLINIC | Age: 55
End: 2025-04-09

## 2025-04-13 ENCOUNTER — TELEPHONE (OUTPATIENT)
Facility: CLINIC | Age: 55
End: 2025-04-13

## 2025-04-13 DIAGNOSIS — M1A.0491 IDIOPATHIC CHRONIC GOUT OF HAND WITH TOPHUS, UNSPECIFIED LATERALITY: Primary | ICD-10-CM

## 2025-04-13 RX ORDER — ALLOPURINOL 100 MG/1
TABLET ORAL
Qty: 255 TABLET | Refills: 1 | Status: SHIPPED | OUTPATIENT
Start: 2025-04-13 | End: 2025-10-10

## 2025-06-11 ENCOUNTER — PATIENT MESSAGE (OUTPATIENT)
Dept: FAMILY MEDICINE CLINIC | Facility: CLINIC | Age: 55
End: 2025-06-11

## (undated) DIAGNOSIS — Z20.822 ENCOUNTER FOR SCREENING LABORATORY TESTING FOR COVID-19 VIRUS IN ASYMPTOMATIC PATIENT: Primary | ICD-10-CM

## (undated) NOTE — LETTER
August 16, 2017               [address] [facility] [address]    Dear Frances Section,       Here are your results from your recent visit with Gastroenterology. Normal CBC. Can stop iron treatment.       If you need any further

## (undated) NOTE — LETTER
2020      RE: Jose Martin Ibarra  : 1970      To Whom it May Concern,      Jose Martin Ibarra has completed his COVID quarantine.  He may return to work on 2020        Garry Carrillo MD

## (undated) NOTE — LETTER
2020      RE: Lilian Montes  : 1970      To Whom it May Concern,      Lilian Montes was seen via a telehealth visit 2020. He continues to suffer symptoms from COVID infection.  He must be out of work 2020 through

## (undated) NOTE — Clinical Note
04/10/2017        Kristal March  [address] [address]      Dear Ines Horan,    [address] [facility] records indicate that you have outstanding lab work and or testing that was ordered for you and has not yet been completed:  Lab Frequency Next Occurr

## (undated) NOTE — Clinical Note
05/19/2017        Richi Noonan  [address] [facility] [address]      Dear Demar Diaz,    [address] [facility] records indicate that you have outstanding fasting lab work and or testing that was ordered for you and has not yet been completed:  Lab Frequency Nex

## (undated) NOTE — LETTER
12/04/19        Michael Nguyen  [address] [address]      Dear Pawel Guo,    [address] [facility] records indicate that you have outstanding lab work and or testing that was ordered for you and has not yet been completed:  Lab Frequency Next Breonna Lawrence

## (undated) NOTE — LETTER
2022      RE: Raf Gloria  : 1970      To Whom it May Concern,      Raf Gloria has recovered from a COVID infection and may travel.         Jonna Lindsey MD

## (undated) NOTE — Clinical Note
See if HLA  genetic test requires prior authorization.  Once this is resolved, please have patient get blood work to test for this gene. Need this prior to gettomg started on allopurinol.